# Patient Record
Sex: FEMALE | Race: BLACK OR AFRICAN AMERICAN
[De-identification: names, ages, dates, MRNs, and addresses within clinical notes are randomized per-mention and may not be internally consistent; named-entity substitution may affect disease eponyms.]

---

## 2018-06-22 ENCOUNTER — HOSPITAL ENCOUNTER (INPATIENT)
Dept: HOSPITAL 72 - EMR | Age: 53
LOS: 4 days | Discharge: HOME | DRG: 139 | End: 2018-06-26
Payer: COMMERCIAL

## 2018-06-22 VITALS — SYSTOLIC BLOOD PRESSURE: 120 MMHG | DIASTOLIC BLOOD PRESSURE: 68 MMHG

## 2018-06-22 VITALS — SYSTOLIC BLOOD PRESSURE: 128 MMHG | DIASTOLIC BLOOD PRESSURE: 74 MMHG

## 2018-06-22 VITALS — BODY MASS INDEX: 40.97 KG/M2 | HEIGHT: 64 IN | WEIGHT: 240 LBS

## 2018-06-22 VITALS — SYSTOLIC BLOOD PRESSURE: 121 MMHG | DIASTOLIC BLOOD PRESSURE: 65 MMHG

## 2018-06-22 VITALS — SYSTOLIC BLOOD PRESSURE: 120 MMHG | DIASTOLIC BLOOD PRESSURE: 61 MMHG

## 2018-06-22 VITALS — SYSTOLIC BLOOD PRESSURE: 123 MMHG | DIASTOLIC BLOOD PRESSURE: 76 MMHG

## 2018-06-22 VITALS — DIASTOLIC BLOOD PRESSURE: 71 MMHG | SYSTOLIC BLOOD PRESSURE: 95 MMHG

## 2018-06-22 DIAGNOSIS — K21.9: ICD-10-CM

## 2018-06-22 DIAGNOSIS — J45.909: ICD-10-CM

## 2018-06-22 DIAGNOSIS — Z88.6: ICD-10-CM

## 2018-06-22 DIAGNOSIS — E66.01: ICD-10-CM

## 2018-06-22 DIAGNOSIS — Z90.49: ICD-10-CM

## 2018-06-22 DIAGNOSIS — M19.90: ICD-10-CM

## 2018-06-22 DIAGNOSIS — J18.9: Primary | ICD-10-CM

## 2018-06-22 DIAGNOSIS — M94.0: ICD-10-CM

## 2018-06-22 DIAGNOSIS — Z86.718: ICD-10-CM

## 2018-06-22 LAB
ADD MANUAL DIFF: NO
ALBUMIN SERPL-MCNC: 3.5 G/DL (ref 3.4–5)
ALBUMIN/GLOB SERPL: 0.8 {RATIO} (ref 1–2.7)
ALP SERPL-CCNC: 105 U/L (ref 46–116)
ALT SERPL-CCNC: 23 U/L (ref 12–78)
ANION GAP SERPL CALC-SCNC: 8 MMOL/L (ref 5–15)
APPEARANCE UR: (no result)
APTT BLD: 31 SEC (ref 23–33)
APTT PPP: (no result) S
AST SERPL-CCNC: 14 U/L (ref 15–37)
BASOPHILS NFR BLD AUTO: 1.2 % (ref 0–2)
BILIRUB SERPL-MCNC: 0.2 MG/DL (ref 0.2–1)
BUN SERPL-MCNC: 16 MG/DL (ref 7–18)
CALCIUM SERPL-MCNC: 9 MG/DL (ref 8.5–10.1)
CHLORIDE SERPL-SCNC: 106 MMOL/L (ref 98–107)
CK MB SERPL-MCNC: 0.5 NG/ML (ref 0–3.6)
CK SERPL-CCNC: 65 U/L (ref 26–308)
CO2 SERPL-SCNC: 27 MMOL/L (ref 21–32)
CREAT SERPL-MCNC: 0.9 MG/DL (ref 0.55–1.3)
EOSINOPHIL NFR BLD AUTO: 1.4 % (ref 0–3)
ERYTHROCYTE [DISTWIDTH] IN BLOOD BY AUTOMATED COUNT: 13.1 % (ref 11.6–14.8)
GLOBULIN SER-MCNC: 4.2 G/DL
GLUCOSE UR STRIP-MCNC: NEGATIVE MG/DL
HCT VFR BLD CALC: 37.5 % (ref 37–47)
HGB BLD-MCNC: 12.4 G/DL (ref 12–16)
INR PPP: 0.9 (ref 0.9–1.1)
KETONES UR QL STRIP: NEGATIVE
LEUKOCYTE ESTERASE UR QL STRIP: (no result)
LYMPHOCYTES NFR BLD AUTO: 29.3 % (ref 20–45)
MCV RBC AUTO: 89 FL (ref 80–99)
MONOCYTES NFR BLD AUTO: 4.4 % (ref 1–10)
NEUTROPHILS NFR BLD AUTO: 63.6 % (ref 45–75)
NITRITE UR QL STRIP: NEGATIVE
PH UR STRIP: 7 [PH] (ref 4.5–8)
PLATELET # BLD: 359 K/UL (ref 150–450)
POTASSIUM SERPL-SCNC: 4.3 MMOL/L (ref 3.5–5.1)
PROT UR QL STRIP: NEGATIVE
RBC # BLD AUTO: 4.2 M/UL (ref 4.2–5.4)
SODIUM SERPL-SCNC: 141 MMOL/L (ref 136–145)
SP GR UR STRIP: 1 (ref 1–1.03)
UROBILINOGEN UR-MCNC: NORMAL MG/DL (ref 0–1)
WBC # BLD AUTO: 8.4 K/UL (ref 4.8–10.8)

## 2018-06-22 PROCEDURE — 71275 CT ANGIOGRAPHY CHEST: CPT

## 2018-06-22 PROCEDURE — 99285 EMERGENCY DEPT VISIT HI MDM: CPT

## 2018-06-22 PROCEDURE — 93017 CV STRESS TEST TRACING ONLY: CPT

## 2018-06-22 PROCEDURE — 71045 X-RAY EXAM CHEST 1 VIEW: CPT

## 2018-06-22 PROCEDURE — 80307 DRUG TEST PRSMV CHEM ANLYZR: CPT

## 2018-06-22 PROCEDURE — 80061 LIPID PANEL: CPT

## 2018-06-22 PROCEDURE — 87040 BLOOD CULTURE FOR BACTERIA: CPT

## 2018-06-22 PROCEDURE — 83880 ASSAY OF NATRIURETIC PEPTIDE: CPT

## 2018-06-22 PROCEDURE — 94664 DEMO&/EVAL PT USE INHALER: CPT

## 2018-06-22 PROCEDURE — 93306 TTE W/DOPPLER COMPLETE: CPT

## 2018-06-22 PROCEDURE — 78452 HT MUSCLE IMAGE SPECT MULT: CPT

## 2018-06-22 PROCEDURE — 85730 THROMBOPLASTIN TIME PARTIAL: CPT

## 2018-06-22 PROCEDURE — 85379 FIBRIN DEGRADATION QUANT: CPT

## 2018-06-22 PROCEDURE — 84443 ASSAY THYROID STIM HORMONE: CPT

## 2018-06-22 PROCEDURE — 82550 ASSAY OF CK (CPK): CPT

## 2018-06-22 PROCEDURE — 36415 COLL VENOUS BLD VENIPUNCTURE: CPT

## 2018-06-22 PROCEDURE — 82553 CREATINE MB FRACTION: CPT

## 2018-06-22 PROCEDURE — 84484 ASSAY OF TROPONIN QUANT: CPT

## 2018-06-22 PROCEDURE — 85025 COMPLETE CBC W/AUTO DIFF WBC: CPT

## 2018-06-22 PROCEDURE — 85610 PROTHROMBIN TIME: CPT

## 2018-06-22 PROCEDURE — 83605 ASSAY OF LACTIC ACID: CPT

## 2018-06-22 PROCEDURE — 80053 COMPREHEN METABOLIC PANEL: CPT

## 2018-06-22 PROCEDURE — 93005 ELECTROCARDIOGRAM TRACING: CPT

## 2018-06-22 PROCEDURE — 80048 BASIC METABOLIC PNL TOTAL CA: CPT

## 2018-06-22 PROCEDURE — 86140 C-REACTIVE PROTEIN: CPT

## 2018-06-22 PROCEDURE — 81003 URINALYSIS AUTO W/O SCOPE: CPT

## 2018-06-22 RX ADMIN — HEPARIN SODIUM SCH UNITS: 5000 INJECTION INTRAVENOUS; SUBCUTANEOUS at 21:38

## 2018-06-22 NOTE — HISTORY AND PHYSICAL REPORT
DATE OF ADMISSION:  06/22/2018



CHIEF COMPLAINT:  This is a 53-year-old, -American female, presents

with chief complaint of chest pain.



HISTORY OF PRESENT ILLNESS:  The patient has a history of right leg deep

venous thrombosis 12 years ago.  The patient was on Coumadin at that

time.



History of present illness began early this morning.  The patient was

awakened from her sleep with left-sided chest pain.  There was radiation

to the epigastric region.  The patient was transported via EMS to [facility].  The patient is admitted with chest pain to rule out acute

coronary syndrome versus pulmonary embolism.



REVIEW OF SYSTEMS:  CONSTITUTIONAL:  The patient denies weight loss or

weight gain.  The patient denies fevers or chills.  HEENT:  The patient

denies ear or throat pain.  The patient denies headache.  CARDIOVASCULAR:

The patient complains of chest pain as above.  The patient denies

palpitations.  CHEST:  The patient denies wheezing or shortness of breath.

ABDOMEN:  The patient denies nausea, vomiting, diarrhea, or constipation.

The patient does complain of epigastric pain as above.  NEUROMUSCULAR:

The patient denies seizures or generalized weakness.  GENITOURINARY:  The

patient denies dysuria or increased frequency of urination.



PAST MEDICAL HISTORY:  Significant for,



1. Right leg deep venous thrombosis 12 years ago.

2. Asthma.

3. Arthritis.



PAST SURGICAL HISTORY:  Significant for laparoscopic

cholecystectomy.



CURRENT MEDICATIONS:  The patient denies.



ALLERGIES:  To tramadol.



SOCIAL HISTORY:  The patient is single and is unemployed.  The patient

denies tobacco or alcohol use.



PHYSICAL EXAMINATION:

VITAL SIGNS:  Temperature 98.5, respirations 16, pulse 77, and blood

pressure 95/71.

GENERAL:  The patient is a well-developed, well-nourished, obese

-American female, in no apparent distress.

HEENT:  Eyes, pupils are equal responsive to light and accommodation.

Extraocular movements are intact.

NECK:  Supple without lymphadenopathy.

CHEST:  Lungs are clear to auscultation bilaterally without wheezes or

rales.

CARDIOVASCULAR:  Regular rhythm and rate.  S1 and S2 are normal without

murmurs, rubs, or gallops.

ABDOMEN:  Soft, nontender, nondistended with positive bowel sounds.  No

evidence of hepatosplenomegaly.  Currently, no rebound or guarding

noted.

EXTREMITIES:  Negative for clubbing, cyanosis, or edema.

RECTAL/GENITAL:  Refused.

NEUROLOGIC:  Cranial nerves II through XII are grossly intact without focal

deficits.  Motor strength is 5/5 bilaterally.  Deep tendon reflexes are 2+

plantar.



LABORATORY STUDIES:  WBC 8.4, hemoglobin 12.4, hematocrit 37.5, platelets

259,000, sodium 141, potassium 4.3, chloride 106, CO2 27, BUN 16,

creatinine 0.9, glucose 149.  Troponin normal at 0.017.  Chest x-ray

revealed retrocardiac infiltrate.  A CT angiogram of the chest was

reported as negative for pulmonary embolus.



ASSESSMENT:  This is a 53-year-old -American female:



1. Retrocardiac pneumonia.

2. Chest pain.

3. Epigastric pain.

4. Asthma.

5. Arthritis.

6. Obesity.



PLAN:

1. Retrocardiac pneumonia.  The patient has been started empirically on

intravenous Levaquin.  A Pulmonary consultation has been obtained with Dr. Debra Gunter.  The patient has received Zosyn in the emergency room.  We

will follow recommendations of Pulmonary.

2. Asthma.  The patient will remain on albuterol/ipratropium nebulized

q.4 h. p.r.n.

3. Morbid obesity.

4. Chest pain.  A Cardiology consultation has been obtained with Dr. Lopez.  Serial troponin levels will be performed.  Chest pain may be

secondary to pneumonia above or may be acute coronary syndrome.  We will

follow recommendation of Cardiology.









  ______________________________________________

  Nnamdi Talamantes M.D.





DR:  MARTINA

D:  06/22/2018 19:00

T:  06/22/2018 23:14

JOB#:  [id number]

CC:

## 2018-06-22 NOTE — DIAGNOSTIC IMAGING REPORT
Indication: Shortness of breath

 

Technique: One view of the chest

 

Comparison: none

 

Findings: There are retrocardiac infiltrates with air bronchograms. The right lung

and bilateral pleural spaces are clear. The heart is borderline enlarged

 

Impression: Retrocardiac infiltrate

 

Borderline cardiomegaly

## 2018-06-22 NOTE — CONSULTATION
History of Present Illness


General


Time patient seen:  18:52


Chief Complaint:  Abdominal Pain





Present Illness


HPI


54 y/o female presents w/CP and epigastric pain. EKG with anterolateral 

ischemia. Echo with preserved LV function, D dimer elevated, CTA negative for 

PE.  Hx of DVT 10 years ago.  Chest c ray with small left pleural effusion.  

Troponin negative x2


Allergies:  


Coded Allergies:  


     TRAMADOL (Verified  Allergy, Unknown, 6/22/18)





Patient History


Healthcare decision maker





Resuscitation status


Full Code


Advanced Directive on File








Review of Systems


Constitutional:  Reports: no symptoms


Eye:  Reports: no symptoms


ENT:  Reports: no symptoms


Respiratory:  Reports: no symptoms


Cardiovascular:  Reports: chest pain


Gastrointestinal:  Reports: abdominal pain


Genitourinary:  Reports: no symptoms


Musculoskeletal:  Reports: no symptoms


Skin:  Reports: no symptoms


Psychiatric:  Reports: no symptoms


Neurological:  Reports: no symptoms


Endocrine:  Reports: no symptoms


Hematologic/Lymphatic:  Reports: no symptoms





Physical Exam


General Appearance:  no apparent distress


Lines, tubes and drains:  peripheral


HEENT:  normocephalic


Neck:  non-tender


Respiratory/Chest:  chest wall non-tender


Cardiovascular/Chest:  normal peripheral pulses


Abdomen:  normal bowel sounds


Extremities:  normal range of motion


Skin Exam:  normal pigmentation


Neurologic:  CNs II-XII grossly normal





Last 24 Hour Vital Signs








  Date Time  Temp Pulse Resp B/P (MAP) Pulse Ox O2 Delivery O2 Flow Rate FiO2


 


6/22/18 10:47 98.5  16 95/71 99 Room Air  





 98.5       


 


6/22/18 10:47 98.5  16 95/71 99 Room Air  





 98.5       


 


6/22/18 08:41 98.5  16 120/61 99 Room Air  





 98.5       


 


6/22/18 08:40 98.2       


 


6/22/18 07:46 98.2  16 123/76 99 Room Air  





 98.2       


 


6/22/18 07:02 98.3 76 16 123/76 99 Room Air  





 98.2       











Laboratory Tests








Test


  6/22/18


07:30 6/22/18


09:20 6/22/18


10:00 6/22/18


14:05


 


White Blood Count


  8.4 K/UL


(4.8-10.8) 


  


  


 


 


Red Blood Count


  4.20 M/UL


(4.20-5.40) 


  


  


 


 


Hemoglobin


  12.4 G/DL


(12.0-16.0) 


  


  


 


 


Hematocrit


  37.5 %


(37.0-47.0) 


  


  


 


 


Mean Corpuscular Volume 89 FL (80-99)     


 


Mean Corpuscular Hemoglobin


  29.5 PG


(27.0-31.0) 


  


  


 


 


Mean Corpuscular Hemoglobin


Concent 33.0 G/DL


(32.0-36.0) 


  


  


 


 


Red Cell Distribution Width


  13.1 %


(11.6-14.8) 


  


  


 


 


Platelet Count


  359 K/UL


(150-450) 


  


  


 


 


Mean Platelet Volume


  6.0 FL


(6.5-10.1)  L 


  


  


 


 


Neutrophils (%) (Auto)


  63.6 %


(45.0-75.0) 


  


  


 


 


Lymphocytes (%) (Auto)


  29.3 %


(20.0-45.0) 


  


  


 


 


Monocytes (%) (Auto)


  4.4 %


(1.0-10.0) 


  


  


 


 


Eosinophils (%) (Auto)


  1.4 %


(0.0-3.0) 


  


  


 


 


Basophils (%) (Auto)


  1.2 %


(0.0-2.0) 


  


  


 


 


Prothrombin Time


  9.7 SEC


(9.30-11.50) 


  


  


 


 


Prothromb Time International


Ratio 0.9 (0.9-1.1)  


  


  


  


 


 


Activated Partial


Thromboplast Time 31 SEC (23-33)


  


  


  


 


 


D-Dimer


  1.52 mg/L FEU


(0.00-0.49)  H 


  


  


 


 


Sodium Level


  141 MMOL/L


(136-145) 


  


  


 


 


Potassium Level


  4.3 MMOL/L


(3.5-5.1) 


  


  


 


 


Chloride Level


  106 MMOL/L


() 


  


  


 


 


Carbon Dioxide Level


  27 MMOL/L


(21-32) 


  


  


 


 


Anion Gap


  8 mmol/L


(5-15) 


  


  


 


 


Blood Urea Nitrogen


  16 mg/dL


(7-18) 


  


  


 


 


Creatinine


  0.9 MG/DL


(0.55-1.30) 


  


  


 


 


Estimat Glomerular Filtration


Rate > 60 mL/min


(>60) 


  


  


 


 


Glucose Level


  149 MG/DL


()  H 


  


  


 


 


Calcium Level


  9.0 MG/DL


(8.5-10.1) 


  


  


 


 


Total Bilirubin


  0.2 MG/DL


(0.2-1.0) 


  


  


 


 


Aspartate Amino Transf


(AST/SGOT) 14 U/L (15-37)


L 


  


  


 


 


Alanine Aminotransferase


(ALT/SGPT) 23 U/L (12-78)


  


  


  


 


 


Alkaline Phosphatase


  105 U/L


() 


  


  


 


 


Total Creatine Kinase


  65 U/L


() 


  


  


 


 


Creatine Kinase MB


  0.5 NG/ML


(0.0-3.6) 


  


  


 


 


Creatine Kinase MB Relative


Index 0.7  


  


  


  


 


 


Troponin I


  0.017 ng/mL


(0.000-0.056) 


  


  0.017 ng/mL


(0.000-0.056)


 


Pro-B-Type Natriuretic Peptide


  64 pg/mL


(0-125) 


  


  


 


 


Total Protein


  7.7 G/DL


(6.4-8.2) 


  


  


 


 


Albumin


  3.5 G/DL


(3.4-5.0) 


  


  


 


 


Globulin 4.2 g/dL     


 


Albumin/Globulin Ratio


  0.8 (1.0-2.7)


L 


  


  


 


 


Urine Color  Pale yellow    


 


Urine Appearance


  


  Slightly


cloudy 


  


 


 


Urine pH  7 (4.5-8.0)    


 


Urine Specific Gravity


  


  1.005


(1.005-1.035) 


  


 


 


Urine Protein


  


  Negative


(NEGATIVE) 


  


 


 


Urine Glucose (UA)


  


  Negative


(NEGATIVE) 


  


 


 


Urine Ketones


  


  Negative


(NEGATIVE) 


  


 


 


Urine Occult Blood


  


  2+ (NEGATIVE)


H 


  


 


 


Urine Nitrite


  


  Negative


(NEGATIVE) 


  


 


 


Urine Bilirubin


  


  Negative


(NEGATIVE) 


  


 


 


Urine Urobilinogen


  


  Normal MG/DL


(0.0-1.0) 


  


 


 


Urine Leukocyte Esterase


  


  1+ (NEGATIVE)


H 


  


 


 


Urine RBC


  


  2-4 /HPF (0 -


2)  H 


  


 


 


Urine WBC


  


  2-4 /HPF (0 -


2) 


  


 


 


Urine Squamous Epithelial


Cells 


  Few /LPF


(NONE/OCC) 


  


 


 


Urine Bacteria


  


  Few /HPF


(NONE) 


  


 


 


Urine Opiates Screen


  


  Negative


(NEGATIVE) 


  


 


 


Urine Barbiturates Screen


  


  Negative


(NEGATIVE) 


  


 


 


Phencyclidine (PCP) Screen


  


  Negative


(NEGATIVE) 


  


 


 


Urine Amphetamines Screen


  


  Negative


(NEGATIVE) 


  


 


 


Urine Benzodiazepines Screen


  


  Negative


(NEGATIVE) 


  


 


 


Urine Cocaine Screen


  


  Negative


(NEGATIVE) 


  


 


 


Urine Marijuana (THC) Screen


  


  Negative


(NEGATIVE) 


  


 


 


Lactic Acid Level


  


  


  1.40 mmol/L


(0.4-2.0) 


 








Height (Feet):  5


Height (Inches):  4.00


Weight (Pounds):  240


Medications





Current Medications








 Medications


  (Trade)  Dose


 Ordered  Sig/Ellis


 Route


 PRN Reason  Start Time


 Stop Time Status Last Admin


Dose Admin


 


 Acetaminophen


  (Tylenol)  650 mg  Q4H  PRN


 ORAL


 FEVER  6/22/18 10:00


 7/22/18 09:59   


 


 


 Albuterol/


 Ipratropium


  (Albuterol/


 Ipratropium)  3 ml  Q4H  PRN


 HHN


 Shortness of Breath  6/22/18 11:15


 6/27/18 11:14   


 


 


 Aspirin


  (ASA)  162 mg  DAILY


 ORAL


   6/23/18 09:00


 7/23/18 08:59   


 


 


 Diltiazem HCl


  (Cardizem)  10 mg  Q1H  PRN


 IV


 heart rate more than 120  6/22/18 10:00


 7/22/18 09:59   


 


 


 Enalaprilat


  (Vasotec)  2.5 mg  Q6H  PRN


 IV


 sbp more than 160  6/22/18 10:00


 7/22/18 09:59   


 


 


 Heparin Sodium


  (Porcine)


  (Heparin 5000


 units/ml)  5,000 units  EVERY 12  HOURS


 SUBQ


   6/22/18 21:00


 7/22/18 20:59   


 


 


 Iopamidol


  (Isovue-370


 150ml)  150 ml  NOW  PRN


 INJ


 Radiology Procedure  6/22/18 07:15


 6/24/18 07:12   


 


 


 Ketorolac


 Tromethamine


  (Toradol 30mg)  30 mg  Q6H  PRN


 IV


 moderate pain ( 4-6)  6/22/18 10:00


 6/27/18 09:59  6/22/18 13:58


 


 


 Levofloxacin  100 ml @ 


 100 mls/hr  Q24H


 IVPB


   6/22/18 13:00


 6/29/18 12:59  6/22/18 13:45


 


 


 Nitroglycerin


  (Ntg)  0.4 mg  Q5M PRN


 SL


 Prn Chest Pain  6/22/18 10:00


 7/22/18 09:59   


 


 


 Ondansetron HCl


  (Zofran)  4 mg  Q6H  PRN


 IVP


 Nausea & Vomiting  6/22/18 10:00


 7/22/18 09:59   


 


 


 Polyethylene


 Glycol


  (Miralax)  17 gm  DAILYPRN  PRN


 ORAL


 Constipation  6/22/18 10:00


 7/22/18 09:59   


 


 


 Promethazine HCl/


 Codeine


  (Phenergan with


 Codeine)  5 ml  Q4H  PRN


 ORAL


 For Cough  6/22/18 11:15


 7/22/18 11:14   


 


 


 Temazepam


  (Restoril)  15 mg  HSPRN  PRN


 ORAL


 Insomnia  6/22/18 10:00


 6/29/18 09:59   


 











Assessment/Plan


Status:  stable


Assessment/Plan


1-Aspirin


2-Statin


3-H pylori testing


4- Omeprazole


5 -EKG/Echo reviewed, troponin negative


6 -Plan for stress test in 











Roshan Lopez M.D. Jun 22, 2018 16:48

## 2018-06-22 NOTE — HISTORY & PHYSICAL
History and Physical


History & Physicial


Dictated for Int Med-Dr Arnold no. [id number].











Nnamdi Talamantes MD Jun 22, 2018 19:01

## 2018-06-22 NOTE — EMERGENCY ROOM REPORT
History of Present Illness


General


Chief Complaint:  Abdominal Pain


Source:  Patient





Present Illness


HPI


Patient is a 53-year-old female brought in by EMS after increased burning 

sensation to her epigastric area.  Patient prior history of gastroesophageal 

reflux.  She reports having increased pain with deep breath.  Patient prior 

history of deep venous thrombosis and previously been on Coumadin.  She denies 

any recent Coumadin use.  Patient denied any hematemesis or bloody stools.  The 

patient was having prior history of gastric reflux.


Allergies:  


Coded Allergies:  


     TRAMADOL (Verified  Allergy, Unknown, 6/22/18)





Patient History


Past Medical History:  see triage record


Last Menstrual Period:  unk


Reviewed Nursing Documentation:  PMH: Agreed; PSxH: Agreed





Nursing Documentation-PMH


Past Medical History:  No History, Except For


Hx Asthma:  Yes


Hx Gastrointestinal Problems:  Yes - GERD





Review of Systems


All Other Systems:  negative except mentioned in HPI





Physical Exam





Vital Signs








  Date Time  Temp Pulse Resp B/P (MAP) Pulse Ox O2 Delivery O2 Flow Rate FiO2


 


6/22/18 07:02 98.3 76 16 123/76 99 Room Air  





 98.2       








Sp02 EP Interpretation:  reviewed, normal


General Appearance:  normal inspection, well appearing, no apparent distress, 

alert, GCS 15


Head:  atraumatic


ENT:  normal ENT inspection, hearing grossly normal, normal voice


Neck:  normal inspection, full range of motion, supple, no bony tend


Respiratory:  normal inspection, lungs clear, normal breath sounds, no 

respiratory distress, no retraction, no wheezing


Cardiovascular #1:  regular rate, rhythm, no edema


Gastrointestinal:  normal inspection, normal bowel sounds, non tender, soft, no 

guarding, no hernia


Genitourinary:  no CVA tenderness


Musculoskeletal:  normal inspection, back normal, normal range of motion


Neurologic:  normal inspection, alert, oriented x3, responsive, CNs III-XII nml 

as tested, speech normal


Psychiatric:  normal inspection, judgement/insight normal, mood/affect normal


Skin:  normal inspection, normal color, no rash





Medical Decision Making


Diagnostic Impression:  


 Primary Impression:  


 Chest pain


 Additional Impressions:  


 ACS (acute coronary syndrome)


 Pleural effusion, left


 Pneumonia


ER Course


Patient presented for chest pain.  Differential diagnosis included but was not 

limited to acute coronary syndrome, pulmonary embolism, pneumonia, aortic 

dissection, shingles, pneumothorax, aortic dissection, esophageal rupture, 

pericarditis. Because of complexity of patient's case laboratory testing and 

imaging studies were ordered.


EKG interpreted by me showed normal sinus rhythm with a rate of 71 with 

anterior lateral T-wave inversion.  The patient was given aspirin.  She was 

also given acid blockers.  A CTA was ordered due to patient's prior history of 

DVT and risk for PE.  CTA read by radiology showed left basilar consolidation, 

left pleural fluid.  Minimal pericardial thickening.   Dr. Josue Arnold was 

contacted for inpatient management





Labs








Test


  6/22/18


07:30


 


White Blood Count


  8.4 K/UL


(4.8-10.8)


 


Red Blood Count


  4.20 M/UL


(4.20-5.40)


 


Hemoglobin


  12.4 G/DL


(12.0-16.0)


 


Hematocrit


  37.5 %


(37.0-47.0)


 


Mean Corpuscular Volume 89 FL (80-99) 


 


Mean Corpuscular Hemoglobin


  29.5 PG


(27.0-31.0)


 


Mean Corpuscular Hemoglobin


Concent 33.0 G/DL


(32.0-36.0)


 


Red Cell Distribution Width


  13.1 %


(11.6-14.8)


 


Platelet Count


  359 K/UL


(150-450)


 


Mean Platelet Volume


  6.0 FL


(6.5-10.1)


 


Neutrophils (%) (Auto)


  63.6 %


(45.0-75.0)


 


Lymphocytes (%) (Auto)


  29.3 %


(20.0-45.0)


 


Monocytes (%) (Auto)


  4.4 %


(1.0-10.0)


 


Eosinophils (%) (Auto)


  1.4 %


(0.0-3.0)


 


Basophils (%) (Auto)


  1.2 %


(0.0-2.0)


 


Prothrombin Time


  9.7 SEC


(9.30-11.50)


 


Prothromb Time International


Ratio 0.9 (0.9-1.1) 


 


 


Activated Partial


Thromboplast Time 31 SEC (23-33) 


 


 


D-Dimer


  1.52 mg/L FEU


(0.00-0.49)


 


Sodium Level


  141 MMOL/L


(136-145)


 


Potassium Level


  4.3 MMOL/L


(3.5-5.1)


 


Chloride Level


  106 MMOL/L


()


 


Carbon Dioxide Level


  27 MMOL/L


(21-32)


 


Anion Gap


  8 mmol/L


(5-15)


 


Blood Urea Nitrogen


  16 mg/dL


(7-18)


 


Creatinine


  0.9 MG/DL


(0.55-1.30)


 


Estimat Glomerular Filtration


Rate > 60 mL/min


(>60)


 


Glucose Level


  149 MG/DL


()


 


Calcium Level


  9.0 MG/DL


(8.5-10.1)


 


Total Bilirubin


  0.2 MG/DL


(0.2-1.0)


 


Aspartate Amino Transf


(AST/SGOT) 14 U/L (15-37) 


 


 


Alanine Aminotransferase


(ALT/SGPT) 23 U/L (12-78) 


 


 


Alkaline Phosphatase


  105 U/L


()


 


Total Creatine Kinase


  65 U/L


()


 


Creatine Kinase MB


  0.5 NG/ML


(0.0-3.6)


 


Creatine Kinase MB Relative


Index 0.7 


 


 


Troponin I


  0.017 ng/mL


(0.000-0.056)


 


Pro-B-Type Natriuretic Peptide


  64 pg/mL


(0-125)


 


Total Protein


  7.7 G/DL


(6.4-8.2)


 


Albumin


  3.5 G/DL


(3.4-5.0)


 


Globulin 4.2 g/dL 


 


Albumin/Globulin Ratio 0.8 (1.0-2.7) 








EKG Diagnostic Results


Rate:  normal


Rhythm:  NSR


ST Segments:  other - anterolateral t wave inversion





Rhythm Strip Diag. Results


EP Interpretation:  yes


Rhythm:  NSR - 76, no PVC's, no ectopy





Last Vital Signs








  Date Time  Temp Pulse Resp B/P (MAP) Pulse Ox O2 Delivery O2 Flow Rate FiO2


 


6/22/18 07:02 98.3 76 16 123/76 99 Room Air  





 98.2       








Status:  unchanged


Disposition:  ADMITTED AS INPATIENT


Condition:  Serious











Carson Chowdhury MD Jun 22, 2018 07:39

## 2018-06-22 NOTE — DIAGNOSTIC IMAGING REPORT
ndication: Chest pain

 

Technique: IV administration nonionic contrast. Spiral acquisitions obtained from the

lung bases to the lung apices. Multiplanar and 3-D reconstructions were generated.

Total dose length product 1163.42 mGycm. CTDIvol(s) 37.74 mGy.  Dose reduction

achieved using automated exposure control

 

 

Comparison: none

 

Findings: There is good quality opacification of the pulmonary arteries. No

intraluminal filling defects or other findings to suggest acute pulmonary embolus

demonstrated. No evidence of thoracic aortic aneurysm or dissection. No definite

pulmonary arterial dilatation or right ventricular dilatation. The heart is mildly

enlarged

 

Areas of consolidation are seen in the left lower lobe and minimally in the posterior

left upper lobe. Dependent atelectatic changes are seen in the right lower lobe.

There is trace pleural fluid on the left. No masses or nodules.

 

There is minimal pericardial thickening or fluid. No mediastinal or hilar mass or

adenopathy. No axillary or chest wall mass or adenopathy.

 

The included upper abdominal anatomy is unremarkable

 

Impression: Negative for evidence of acute pulmonary embolus or other acute thoracic

vascular pathology

 

Left basilar pulmonary parenchymal consolidation, may indicate pneumonia

 

Trace left pleural fluid

 

Minimal dependent right lower lobe atelectatic changes

 

Minimal pericardial thickening or fluid

 

 

 

The CT scanner at [facility] is accredited by the [employer] College of

Radiology and the scans are performed using protocols designed to limit radiation

exposure to as low as reasonably achievable to attain images of sufficient resolution

adequate for diagnostic evaluation.

## 2018-06-22 NOTE — CONSULTATION
History of Present Illness


General


Date patient seen:  Jun 22, 2018


Chief Complaint:  Abdominal Pain





Present Illness


HPI


 53-year-old female with hx of asmth, non-smoker, DVT brought in by EMS  with 

CC of burning sensation to her epigastric area.   She reports having increased 

pain with deep breath.  No fever or cough.  Pt is admitted to telemetry b/o 

abnormal EKG. A CT of chest ruled out acute PE and showed some parenchymal 

changes in LLL.


Allergies:  


Coded Allergies:  


     TRAMADOL (Verified  Allergy, Unknown, 6/22/18)





Patient History


Healthcare decision maker





Resuscitation status





Advanced Directive on File








Past Medical/Surgical History


Past Medical/Surgical History:  


(1) History of asthma





Review of Systems


All Other Systems:  negative except mentioned in HPI





Physical Exam


General Appearance:  WD/WN, no apparent distress


Lines, tubes and drains:  peripheral


HEENT:  normocephalic


Neck:  non-tender, normal alignment


Respiratory/Chest:  chest wall non-tender, lungs clear


Breasts:  no masses


Cardiovascular/Chest:  normal peripheral pulses, normal rate, regularly 

irregular


Abdomen:  normal bowel sounds


Genitourinary/Rectal:  normal genital exam


Extremities:  normal range of motion


Skin Exam:  normal pigmentation


Neurologic:  CNs II-XII grossly normal





Last 24 Hour Vital Signs








  Date Time  Temp Pulse Resp B/P (MAP) Pulse Ox O2 Delivery O2 Flow Rate FiO2


 


6/22/18 10:47 98.5  16 95/71 99 Room Air  





 98.5       


 


6/22/18 10:47 98.5  16 95/71 99 Room Air  





 98.5       


 


6/22/18 08:41 98.5  16 120/61 99 Room Air  





 98.5       


 


6/22/18 08:40 98.2       


 


6/22/18 07:46 98.2  16 123/76 99 Room Air  





 98.2       


 


6/22/18 07:02 98.3 76 16 123/76 99 Room Air  





 98.2       











Laboratory Tests








Test


  6/22/18


07:30 6/22/18


09:20 6/22/18


10:00


 


White Blood Count


  8.4 K/UL


(4.8-10.8) 


  


 


 


Red Blood Count


  4.20 M/UL


(4.20-5.40) 


  


 


 


Hemoglobin


  12.4 G/DL


(12.0-16.0) 


  


 


 


Hematocrit


  37.5 %


(37.0-47.0) 


  


 


 


Mean Corpuscular Volume 89 FL (80-99)    


 


Mean Corpuscular Hemoglobin


  29.5 PG


(27.0-31.0) 


  


 


 


Mean Corpuscular Hemoglobin


Concent 33.0 G/DL


(32.0-36.0) 


  


 


 


Red Cell Distribution Width


  13.1 %


(11.6-14.8) 


  


 


 


Platelet Count


  359 K/UL


(150-450) 


  


 


 


Mean Platelet Volume


  6.0 FL


(6.5-10.1)  L 


  


 


 


Neutrophils (%) (Auto)


  63.6 %


(45.0-75.0) 


  


 


 


Lymphocytes (%) (Auto)


  29.3 %


(20.0-45.0) 


  


 


 


Monocytes (%) (Auto)


  4.4 %


(1.0-10.0) 


  


 


 


Eosinophils (%) (Auto)


  1.4 %


(0.0-3.0) 


  


 


 


Basophils (%) (Auto)


  1.2 %


(0.0-2.0) 


  


 


 


Prothrombin Time


  9.7 SEC


(9.30-11.50) 


  


 


 


Prothromb Time International


Ratio 0.9 (0.9-1.1)  


  


  


 


 


Activated Partial


Thromboplast Time 31 SEC (23-33)


  


  


 


 


D-Dimer


  1.52 mg/L FEU


(0.00-0.49)  H 


  


 


 


Sodium Level


  141 MMOL/L


(136-145) 


  


 


 


Potassium Level


  4.3 MMOL/L


(3.5-5.1) 


  


 


 


Chloride Level


  106 MMOL/L


() 


  


 


 


Carbon Dioxide Level


  27 MMOL/L


(21-32) 


  


 


 


Anion Gap


  8 mmol/L


(5-15) 


  


 


 


Blood Urea Nitrogen


  16 mg/dL


(7-18) 


  


 


 


Creatinine


  0.9 MG/DL


(0.55-1.30) 


  


 


 


Estimat Glomerular Filtration


Rate > 60 mL/min


(>60) 


  


 


 


Glucose Level


  149 MG/DL


()  H 


  


 


 


Calcium Level


  9.0 MG/DL


(8.5-10.1) 


  


 


 


Total Bilirubin


  0.2 MG/DL


(0.2-1.0) 


  


 


 


Aspartate Amino Transf


(AST/SGOT) 14 U/L (15-37)


L 


  


 


 


Alanine Aminotransferase


(ALT/SGPT) 23 U/L (12-78)


  


  


 


 


Alkaline Phosphatase


  105 U/L


() 


  


 


 


Total Creatine Kinase


  65 U/L


() 


  


 


 


Creatine Kinase MB


  0.5 NG/ML


(0.0-3.6) 


  


 


 


Creatine Kinase MB Relative


Index 0.7  


  


  


 


 


Troponin I


  0.017 ng/mL


(0.000-0.056) 


  


 


 


Pro-B-Type Natriuretic Peptide


  64 pg/mL


(0-125) 


  


 


 


Total Protein


  7.7 G/DL


(6.4-8.2) 


  


 


 


Albumin


  3.5 G/DL


(3.4-5.0) 


  


 


 


Globulin 4.2 g/dL    


 


Albumin/Globulin Ratio


  0.8 (1.0-2.7)


L 


  


 


 


Urine Color  Pale yellow   


 


Urine Appearance


  


  Slightly


cloudy 


 


 


Urine pH  7 (4.5-8.0)   


 


Urine Specific Gravity


  


  1.005


(1.005-1.035) 


 


 


Urine Protein


  


  Negative


(NEGATIVE) 


 


 


Urine Glucose (UA)


  


  Negative


(NEGATIVE) 


 


 


Urine Ketones


  


  Negative


(NEGATIVE) 


 


 


Urine Occult Blood


  


  2+ (NEGATIVE)


H 


 


 


Urine Nitrite


  


  Negative


(NEGATIVE) 


 


 


Urine Bilirubin


  


  Negative


(NEGATIVE) 


 


 


Urine Urobilinogen


  


  Normal MG/DL


(0.0-1.0) 


 


 


Urine Leukocyte Esterase


  


  1+ (NEGATIVE)


H 


 


 


Urine RBC


  


  2-4 /HPF (0 -


2)  H 


 


 


Urine WBC


  


  2-4 /HPF (0 -


2) 


 


 


Urine Squamous Epithelial


Cells 


  Few /LPF


(NONE/OCC) 


 


 


Urine Bacteria


  


  Few /HPF


(NONE) 


 


 


Urine Opiates Screen


  


  Negative


(NEGATIVE) 


 


 


Urine Barbiturates Screen


  


  Negative


(NEGATIVE) 


 


 


Phencyclidine (PCP) Screen


  


  Negative


(NEGATIVE) 


 


 


Urine Amphetamines Screen


  


  Negative


(NEGATIVE) 


 


 


Urine Benzodiazepines Screen


  


  Negative


(NEGATIVE) 


 


 


Urine Cocaine Screen


  


  Negative


(NEGATIVE) 


 


 


Urine Marijuana (THC) Screen


  


  Negative


(NEGATIVE) 


 


 


Lactic Acid Level


  


  


  1.40 mmol/L


(0.4-2.0)








Height (Feet):  5


Height (Inches):  4.00


Weight (Pounds):  243


Medications





Current Medications








 Medications


  (Trade)  Dose


 Ordered  Sig/Ellis


 Route


 PRN Reason  Start Time


 Stop Time Status Last Admin


Dose Admin


 


 Acetaminophen


  (Tylenol)  650 mg  Q4H  PRN


 ORAL


 FEVER  6/22/18 10:00


 7/22/18 09:59   


 


 


 Albuterol/


 Ipratropium


  (Albuterol/


 Ipratropium)  3 ml  Q4H  PRN


 HHN


 Shortness of Breath  6/22/18 10:00


 6/27/18 09:59   


 


 


 Aspirin


  (ASA)  162 mg  DAILY


 ORAL


   6/23/18 09:00


 7/23/18 08:59   


 


 


 Diltiazem HCl


  (Cardizem)  10 mg  Q1H  PRN


 IV


 heart rate more than 120  6/22/18 10:00


 7/22/18 09:59   


 


 


 Enalaprilat


  (Vasotec)  2.5 mg  Q6H  PRN


 IV


 sbp more than 160  6/22/18 10:00


 7/22/18 09:59   


 


 


 Heparin Sodium


  (Porcine)


  (Heparin 5000


 units/ml)  5,000 units  EVERY 12  HOURS


 SUBQ


   6/22/18 21:00


 7/22/18 20:59   


 


 


 Iopamidol


  (Isovue-370


 150ml)  150 ml  NOW  PRN


 INJ


 Radiology Procedure  6/22/18 07:15


 6/24/18 07:12   


 


 


 Ketorolac


 Tromethamine


  (Toradol 30mg)  30 mg  Q6H  PRN


 IV


 moderate pain ( 4-6)  6/22/18 10:00


 6/27/18 09:59   


 


 


 Nitroglycerin


  (Ntg)  0.4 mg  Q5M PRN


 SL


 Prn Chest Pain  6/22/18 10:00


 7/22/18 09:59   


 


 


 Ondansetron HCl


  (Zofran)  4 mg  Q6H  PRN


 IVP


 Nausea & Vomiting  6/22/18 10:00


 7/22/18 09:59   


 


 


 Polyethylene


 Glycol


  (Miralax)  17 gm  DAILYPRN  PRN


 ORAL


 Constipation  6/22/18 10:00


 7/22/18 09:59   


 


 


 Temazepam


  (Restoril)  15 mg  HSPRN  PRN


 ORAL


 Insomnia  6/22/18 10:00


 6/29/18 09:59   


 











Assessment/Plan


Problem List:  


(1) ACS (acute coronary syndrome)


ICD Codes:  I24.9 - Acute ischemic heart disease, unspecified


SNOMED:  041600608


(2) Pneumonia


ICD Codes:  J18.9 - Pneumonia, unspecified organism


SNOMED:  197695279


(3) Costochondritis


ICD Codes:  M94.0 - Chondrocostal junction syndrome [[place]ietze]


SNOMED:  43577744


(4) History of asthma


ICD Codes:  Z87.09 - Personal history of other diseases of the respiratory 

system


SNOMED:  210908380


(5) GERD (gastroesophageal reflux disease)


ICD Codes:  K21.9 - Gastro-esophageal reflux disease without esophagitis


SNOMED:  219427302


Assessment/Plan


serial EKG, troponin,


Echo, 


cardiology to see


sputum for c/s


start Levofloxacin


symptomatic treatment


respiratory treatment.











Debra Gunter MD Jun 22, 2018 11:15

## 2018-06-22 NOTE — CARDIOLOGY REPORT
--------------- APPROVED REPORT --------------





EXAM: Two-dimensional and M-mode echocardiogram with Doppler and color 

Doppler.



INDICATION

LV FUNCTION 



M-Mode DIMENSIONS 

IVSd1.2 (0.7-1.1cm)Left Atrium (MM)3.3 (1.6-4.0cm)

LVDd5.7 (3.5-5.6cm)Aortic Root3.5 (2.0-3.7cm)

PWd1.3 (0.7-1.1cm)Aortic Cusp Exc.1.9 (1.5-2.0cm)

IVSs1.4 cm

LVDs3.9 (2.5-4.0cm)

PWs1.7 cm





Normal left ventricular chamber size, systolic function and wall motion .

Left ventricular ejection fraction estimated to be  65-70 %.

Mild left ventricular hypertrophy by 2-D.

No evidence of pericardial effusion. 

All other cardiac chamber sizes are within normal limits. 

Focal aortic valve sclerosis with adequate cusp excursion.

Mildly Thickened mitral valve leaflets with normal excursion.

Mildly Mitral annulus and aortic root calcification.

Pulmonic valve not well visualized.

Normal tricuspid valve structure. 

IVC at normal size with physiologic collapse .



A  color flow and spectral Doppler study was performed and revealed:

No aortic regurgitation.

Trace  mitral regurgitation.

Mitral diastolic velocities suggest reduced left ventricular relaxation c/w mild LV 

diastolic dysfunction (Grade I ). 

Mild  tricuspid regurgitation.

Tricuspid  systolic velocities suggests peak right ventricular systolic pressure of  

30mmHg.

Trace Pulmonic regurgitation present.

## 2018-06-22 NOTE — CARDIOLOGY REPORT
--------------- APPROVED REPORT --------------





EKG Measurement

Heart [id number]

CA [id number]

[id number]

[id number]

[id number]





Normal sinus rhythm

T wave abnormality, consider anterolateral ischemia

Abnormal ECG

## 2018-06-23 VITALS — DIASTOLIC BLOOD PRESSURE: 71 MMHG | SYSTOLIC BLOOD PRESSURE: 124 MMHG

## 2018-06-23 VITALS — DIASTOLIC BLOOD PRESSURE: 73 MMHG | SYSTOLIC BLOOD PRESSURE: 154 MMHG

## 2018-06-23 VITALS — SYSTOLIC BLOOD PRESSURE: 122 MMHG | DIASTOLIC BLOOD PRESSURE: 72 MMHG

## 2018-06-23 VITALS — DIASTOLIC BLOOD PRESSURE: 62 MMHG | SYSTOLIC BLOOD PRESSURE: 115 MMHG

## 2018-06-23 VITALS — DIASTOLIC BLOOD PRESSURE: 70 MMHG | SYSTOLIC BLOOD PRESSURE: 120 MMHG

## 2018-06-23 VITALS — SYSTOLIC BLOOD PRESSURE: 131 MMHG | DIASTOLIC BLOOD PRESSURE: 71 MMHG

## 2018-06-23 LAB
ADD MANUAL DIFF: NO
APTT BLD: 31 SEC (ref 23–33)
BASOPHILS NFR BLD AUTO: 1 % (ref 0–2)
CHOLEST SERPL-MCNC: 196 MG/DL (ref ?–200)
EOSINOPHIL NFR BLD AUTO: 1.9 % (ref 0–3)
ERYTHROCYTE [DISTWIDTH] IN BLOOD BY AUTOMATED COUNT: 13.2 % (ref 11.6–14.8)
HCT VFR BLD CALC: 38 % (ref 37–47)
HDLC SERPL-MCNC: 64 MG/DL (ref 40–60)
HGB BLD-MCNC: 12.1 G/DL (ref 12–16)
INR PPP: 1 (ref 0.9–1.1)
LYMPHOCYTES NFR BLD AUTO: 29.4 % (ref 20–45)
MCV RBC AUTO: 89 FL (ref 80–99)
MONOCYTES NFR BLD AUTO: 4.4 % (ref 1–10)
NEUTROPHILS NFR BLD AUTO: 63.2 % (ref 45–75)
PLATELET # BLD: 382 K/UL (ref 150–450)
RBC # BLD AUTO: 4.25 M/UL (ref 4.2–5.4)
TRIGL SERPL-MCNC: 68 MG/DL (ref 30–150)
WBC # BLD AUTO: 7 K/UL (ref 4.8–10.8)

## 2018-06-23 RX ADMIN — ASPIRIN 81 MG SCH MG: 81 TABLET ORAL at 08:12

## 2018-06-23 RX ADMIN — HEPARIN SODIUM SCH UNITS: 5000 INJECTION INTRAVENOUS; SUBCUTANEOUS at 08:15

## 2018-06-23 RX ADMIN — HEPARIN SODIUM SCH UNITS: 5000 INJECTION INTRAVENOUS; SUBCUTANEOUS at 21:18

## 2018-06-23 NOTE — INTERNAL MED PROGRESS NOTE
Subjective


Date of Service:  Jun 23, 2018


Physician Name


Talamantes,Nnamdi


Attending Physician


Josue Arnold MD





Current Medications








 Medications


  (Trade)  Dose


 Ordered  Sig/Ellis


 Route


 PRN Reason  Start Time


 Stop Time Status Last Admin


Dose Admin


 


 Acetaminophen


  (Tylenol)  650 mg  Q4H  PRN


 ORAL


 FEVER  6/22/18 10:00


 7/22/18 09:59   


 


 


 Albuterol/


 Ipratropium


  (Albuterol/


 Ipratropium)  3 ml  Q4H  PRN


 HHN


 Shortness of Breath  6/22/18 11:15


 6/27/18 11:14   


 


 


 Aspirin


  (ASA)  162 mg  DAILY


 ORAL


   6/23/18 09:00


 7/23/18 08:59  6/23/18 08:12


 


 


 Diltiazem HCl


  (Cardizem)  10 mg  Q1H  PRN


 IV


 heart rate more than 120  6/22/18 10:00


 7/22/18 09:59   


 


 


 Enalaprilat


  (Vasotec)  2.5 mg  Q6H  PRN


 IV


 sbp more than 160  6/22/18 10:00


 7/22/18 09:59   


 


 


 Heparin Sodium


  (Porcine)


  (Heparin 5000


 units/ml)  5,000 units  EVERY 12  HOURS


 SUBQ


   6/22/18 21:00


 7/22/18 20:59  6/23/18 08:15


 


 


 Iopamidol


  (Isovue-370


 150ml)  150 ml  NOW  PRN


 INJ


 Radiology Procedure  6/22/18 07:15


 6/24/18 07:12   


 


 


 Ketorolac


 Tromethamine


  (Toradol 30mg)  30 mg  Q6H  PRN


 IV


 moderate pain ( 4-6)  6/22/18 10:00


 6/27/18 09:59  6/22/18 13:58


 


 


 Levofloxacin  100 ml @ 


 100 mls/hr  Q24H


 IVPB


   6/22/18 13:00


 6/29/18 12:59  6/23/18 13:07


 


 


 Nitroglycerin


  (Ntg)  0.4 mg  Q5M PRN


 SL


 Prn Chest Pain  6/22/18 10:00


 7/22/18 09:59   


 


 


 Ondansetron HCl


  (Zofran)  4 mg  Q6H  PRN


 IVP


 Nausea & Vomiting  6/22/18 10:00


 7/22/18 09:59   


 


 


 Polyethylene


 Glycol


  (Miralax)  17 gm  DAILYPRN  PRN


 ORAL


 Constipation  6/22/18 10:00


 7/22/18 09:59   


 


 


 Promethazine HCl/


 Codeine


  (Phenergan with


 Codeine)  5 ml  Q4H  PRN


 ORAL


 For Cough  6/22/18 11:15


 7/22/18 11:14   


 


 


 Temazepam


  (Restoril)  15 mg  HSPRN  PRN


 ORAL


 Insomnia  6/22/18 10:00


 6/29/18 09:59   


 








Allergies:  


Coded Allergies:  


     TRAMADOL (Verified  Allergy, Unknown, 6/22/18)


ROS Limited/Unobtainable:  No


Constitutional:  Reports: no symptoms


HEENT:  Reports: no symptoms


Cardiovascular:  Reports: chest pain


Respiratory:  Reports: no symptoms


Gastrointestinal/Abdominal:  Reports: no symptoms


Genitourinary:  Reports: no symptoms


Neurologic/Psychiatric:  Reports: no symptoms


Subjective


54 YO F admitted with chest pain.  Cover for Int Med - Dr Arnold.  Await cardiac 

stress test





Objective





Last Vital Signs








  Date Time  Temp Pulse Resp B/P (MAP) Pulse Ox O2 Delivery O2 Flow Rate FiO2


 


6/23/18 12:00 97.9 81 20 124/71 95 Room Air  





 97.9       








General Appearance:  WD/WN, no apparent distress, alert


EENT:  PERRL/EOMI, normal ENT inspection


Neck:  non-tender, normal alignment, supple


Cardiovascular:  normal peripheral pulses, normal rate, regular rhythm, no 

gallop/murmur, no JVD


Respiratory/Chest:  chest wall non-tender, lungs clear, normal breath sounds, 

no respiratory distress, no accessory muscle use


Abdomen:  normal bowel sounds, non tender, soft, no organomegaly, no mass


Extremities:  normal range of motion, non-tender


Neurologic:  CNs II-XII grossly normal, no motor/sensory deficits


Skin:  normal pigmentation, warm/dry





Laboratory Tests








Test


  6/23/18


10:00


 


White Blood Count


  7.0 K/UL


(4.8-10.8)


 


Red Blood Count


  4.25 M/UL


(4.20-5.40)


 


Hemoglobin


  12.1 G/DL


(12.0-16.0)


 


Hematocrit


  38.0 %


(37.0-47.0)


 


Mean Corpuscular Volume 89 FL (80-99)  


 


Mean Corpuscular Hemoglobin


  28.5 PG


(27.0-31.0)


 


Mean Corpuscular Hemoglobin


Concent 31.9 G/DL


(32.0-36.0)  L


 


Red Cell Distribution Width


  13.2 %


(11.6-14.8)


 


Platelet Count


  382 K/UL


(150-450)


 


Mean Platelet Volume


  5.5 FL


(6.5-10.1)  L


 


Neutrophils (%) (Auto)


  63.2 %


(45.0-75.0)


 


Lymphocytes (%) (Auto)


  29.4 %


(20.0-45.0)


 


Monocytes (%) (Auto)


  4.4 %


(1.0-10.0)


 


Eosinophils (%) (Auto)


  1.9 %


(0.0-3.0)


 


Basophils (%) (Auto)


  1.0 %


(0.0-2.0)


 


Prothrombin Time


  10.3 SEC


(9.30-11.50)


 


Prothromb Time International


Ratio 1.0 (0.9-1.1)  


 


 


Activated Partial


Thromboplast Time 31 SEC (23-33)


 


 


Troponin I


  0.000 ng/mL


(0.000-0.056)


 


C-Reactive Protein,


Quantitative 4.8 mg/dL


(0.00-0.90)  H


 


Triglycerides Level


  68 MG/DL


()


 


Cholesterol Level


  196 MG/DL (<


200)


 


LDL Cholesterol


  117 mg/dL


(<100)  H


 


HDL Cholesterol


  64 MG/DL


(40-60)  H


 


Cholesterol/HDL Ratio


  3.1 (3.3-4.4)


L


 


Thyroid Stimulating Hormone


(TSH) 1.840 uiU/mL


(0.358-3.740)











Microbiology








 Date/Time


Source Procedure


Growth Status


 


 


 6/22/18 10:02


Blood Blood Culture - Preliminary


NO GROWTH AFTER 24 HOURS Resulted


 


 6/22/18 10:00


Blood Blood Culture - Preliminary


NO GROWTH AFTER 24 HOURS Resulted

















Intake and Output  


 


 6/22/18 6/23/18





 19:00 07:00


 


Intake Total 300 ml 250 ml


 


Balance 300 ml 250 ml


 


  


 


Intake Oral 300 ml 250 ml


 


# Voids 4 5











Assessment/Plan


Problem List:  


(1) Arthritis


(2) Obesity


(3) Asthma


(4) Epigastric abdominal pain


(5) Chest pain


Assessment & Plan:  See cardiology note.  Await cardiac stress test





Status:  not improved











Nnamdi Talamantes MD Jun 23, 2018 15:41

## 2018-06-23 NOTE — CARDIOLOGY PROGRESS NOTE
Assessment/Plan


Status:  stable


Assessment/Plan


1-Aspirin


2-Statin


3-H pylori testing


4- Omeprazole


5 -EKG/Echo reviewed, troponin negative, CTA negative


6 -Plan for stress test in AM





Subjective


Cardiovascular:  Reports: no symptoms


Respiratory:  Reports: no symptoms


Gastrointestinal/Abdominal:  Reports: no symptoms


Genitourinary:  Reports: no symptoms


Subjective


No acute events


Troponin negative


CTA negative for PE


TTe with preserved function


No current chest pain





Objective





Last 24 Hour Vital Signs








  Date Time  Temp Pulse Resp B/P (MAP) Pulse Ox O2 Delivery O2 Flow Rate FiO2


 


6/23/18 12:00 97.9 81 20 124/71 95 Room Air  





 97.9       


 


6/23/18 12:00  76      


 


6/23/18 08:00 97.8 77 21 154/73 94 Room Air  





 97.8       


 


6/23/18 08:00  77      


 


6/23/18 04:00  65      


 


6/23/18 04:00 97.6 68 19 120/70 96 Room Air  





 97.6       


 


6/23/18 00:00 98.0 70 20 115/62 96 Room Air  





 98.0       


 


6/22/18 20:00  75      


 


6/22/18 20:00 98.1 76 20 128/74 96 Room Air  





 98.1       


 


6/22/18 16:00  84      


 


6/22/18 16:00 98.0 77 19 121/65 96 Room Air  





 98.0       








General Appearance:  no apparent distress


EENT:  PERRL/EOMI


Neck:  non-tender


Rhythm:  NSR


Cardiovascular:  normal peripheral pulses


Respiratory/Chest:  chest wall non-tender


Abdomen:  normal bowel sounds, non tender


Extremities:  normal range of motion


Neurologic:  CNs II-XII grossly normal











Intake and Output  


 


 6/22/18 6/23/18





 19:00 07:00


 


Intake Total 300 ml 250 ml


 


Balance 300 ml 250 ml


 


  


 


Intake Oral 300 ml 250 ml


 


# Voids 4 5











Laboratory Tests








Test


  6/22/18


14:05 6/23/18


10:00


 


Troponin I


  0.017 ng/mL


(0.000-0.056) 0.000 ng/mL


(0.000-0.056)


 


White Blood Count


  


  7.0 K/UL


(4.8-10.8)


 


Red Blood Count


  


  4.25 M/UL


(4.20-5.40)


 


Hemoglobin


  


  12.1 G/DL


(12.0-16.0)


 


Hematocrit


  


  38.0 %


(37.0-47.0)


 


Mean Corpuscular Volume  89 FL (80-99)  


 


Mean Corpuscular Hemoglobin


  


  28.5 PG


(27.0-31.0)


 


Mean Corpuscular Hemoglobin


Concent 


  31.9 G/DL


(32.0-36.0)  L


 


Red Cell Distribution Width


  


  13.2 %


(11.6-14.8)


 


Platelet Count


  


  382 K/UL


(150-450)


 


Mean Platelet Volume


  


  5.5 FL


(6.5-10.1)  L


 


Neutrophils (%) (Auto)


  


  63.2 %


(45.0-75.0)


 


Lymphocytes (%) (Auto)


  


  29.4 %


(20.0-45.0)


 


Monocytes (%) (Auto)


  


  4.4 %


(1.0-10.0)


 


Eosinophils (%) (Auto)


  


  1.9 %


(0.0-3.0)


 


Basophils (%) (Auto)


  


  1.0 %


(0.0-2.0)


 


Prothrombin Time


  


  10.3 SEC


(9.30-11.50)


 


Prothromb Time International


Ratio 


  1.0 (0.9-1.1)  


 


 


Activated Partial


Thromboplast Time 


  31 SEC (23-33)


 


 


C-Reactive Protein,


Quantitative 


  4.8 mg/dL


(0.00-0.90)  H


 


Triglycerides Level


  


  68 MG/DL


()


 


Cholesterol Level


  


  196 MG/DL (<


200)


 


LDL Cholesterol


  


  117 mg/dL


(<100)  H


 


HDL Cholesterol


  


  64 MG/DL


(40-60)  H


 


Cholesterol/HDL Ratio


  


  3.1 (3.3-4.4)


L


 


Thyroid Stimulating Hormone


(TSH) 


  1.840 uiU/mL


(0.358-3.740)











Microbiology








 Date/Time


Source Procedure


Growth Status


 


 


 6/22/18 10:02


Blood Blood Culture - Preliminary


NO GROWTH AFTER 24 HOURS Resulted


 


 6/22/18 10:00


Blood Blood Culture - Preliminary


NO GROWTH AFTER 24 HOURS Resulted

















Roshan Lopez M.D. Jun 23, 2018 13:36

## 2018-06-24 VITALS — DIASTOLIC BLOOD PRESSURE: 78 MMHG | SYSTOLIC BLOOD PRESSURE: 141 MMHG

## 2018-06-24 VITALS — DIASTOLIC BLOOD PRESSURE: 73 MMHG | SYSTOLIC BLOOD PRESSURE: 141 MMHG

## 2018-06-24 VITALS — DIASTOLIC BLOOD PRESSURE: 88 MMHG | SYSTOLIC BLOOD PRESSURE: 156 MMHG

## 2018-06-24 VITALS — SYSTOLIC BLOOD PRESSURE: 125 MMHG | DIASTOLIC BLOOD PRESSURE: 68 MMHG

## 2018-06-24 VITALS — DIASTOLIC BLOOD PRESSURE: 75 MMHG | SYSTOLIC BLOOD PRESSURE: 127 MMHG

## 2018-06-24 VITALS — DIASTOLIC BLOOD PRESSURE: 68 MMHG | SYSTOLIC BLOOD PRESSURE: 119 MMHG

## 2018-06-24 LAB
ADD MANUAL DIFF: NO
ANION GAP SERPL CALC-SCNC: 9 MMOL/L (ref 5–15)
BASOPHILS NFR BLD AUTO: 0.9 % (ref 0–2)
BUN SERPL-MCNC: 17 MG/DL (ref 7–18)
CALCIUM SERPL-MCNC: 9.2 MG/DL (ref 8.5–10.1)
CHLORIDE SERPL-SCNC: 107 MMOL/L (ref 98–107)
CO2 SERPL-SCNC: 23 MMOL/L (ref 21–32)
CREAT SERPL-MCNC: 0.9 MG/DL (ref 0.55–1.3)
EOSINOPHIL NFR BLD AUTO: 2.8 % (ref 0–3)
ERYTHROCYTE [DISTWIDTH] IN BLOOD BY AUTOMATED COUNT: 12.8 % (ref 11.6–14.8)
HCT VFR BLD CALC: 36.2 % (ref 37–47)
HGB BLD-MCNC: 11.9 G/DL (ref 12–16)
LYMPHOCYTES NFR BLD AUTO: 33 % (ref 20–45)
MCV RBC AUTO: 90 FL (ref 80–99)
MONOCYTES NFR BLD AUTO: 4.7 % (ref 1–10)
NEUTROPHILS NFR BLD AUTO: 58.5 % (ref 45–75)
PLATELET # BLD: 349 K/UL (ref 150–450)
POTASSIUM SERPL-SCNC: 4.2 MMOL/L (ref 3.5–5.1)
RBC # BLD AUTO: 4.03 M/UL (ref 4.2–5.4)
SODIUM SERPL-SCNC: 139 MMOL/L (ref 136–145)
WBC # BLD AUTO: 7.2 K/UL (ref 4.8–10.8)

## 2018-06-24 RX ADMIN — HEPARIN SODIUM SCH UNITS: 5000 INJECTION INTRAVENOUS; SUBCUTANEOUS at 20:53

## 2018-06-24 RX ADMIN — HEPARIN SODIUM SCH UNITS: 5000 INJECTION INTRAVENOUS; SUBCUTANEOUS at 08:17

## 2018-06-24 RX ADMIN — ASPIRIN 81 MG SCH MG: 81 TABLET ORAL at 08:16

## 2018-06-24 NOTE — INTERNAL MED PROGRESS NOTE
Subjective


Date of Service:  Jun 24, 2018


Physician Name


Talamantes,Nnamdi


Attending Physician


Josue Arnold MD





Current Medications








 Medications


  (Trade)  Dose


 Ordered  Sig/Ellis


 Route


 PRN Reason  Start Time


 Stop Time Status Last Admin


Dose Admin


 


 Acetaminophen


  (Tylenol)  650 mg  Q4H  PRN


 ORAL


 FEVER  6/22/18 10:00


 7/22/18 09:59   


 


 


 Albuterol/


 Ipratropium


  (Albuterol/


 Ipratropium)  3 ml  Q4H  PRN


 HHN


 Shortness of Breath  6/22/18 11:15


 6/27/18 11:14   


 


 


 Aspirin


  (ASA)  162 mg  DAILY


 ORAL


   6/23/18 09:00


 7/23/18 08:59  6/24/18 08:16


 


 


 Diltiazem HCl


  (Cardizem)  10 mg  Q1H  PRN


 IV


 heart rate more than 120  6/22/18 10:00


 7/22/18 09:59   


 


 


 Enalaprilat


  (Vasotec)  2.5 mg  Q6H  PRN


 IV


 sbp more than 160  6/22/18 10:00


 7/22/18 09:59   


 


 


 Heparin Sodium


  (Porcine)


  (Heparin 5000


 units/ml)  5,000 units  EVERY 12  HOURS


 SUBQ


   6/22/18 21:00


 7/22/18 20:59  6/24/18 08:17


 


 


 Ketorolac


 Tromethamine


  (Toradol 30mg)  30 mg  Q6H  PRN


 IV


 moderate pain ( 4-6)  6/22/18 10:00


 6/27/18 09:59  6/22/18 13:58


 


 


 Levofloxacin  100 ml @ 


 100 mls/hr  Q24H


 IVPB


   6/22/18 13:00


 6/29/18 12:59  6/24/18 12:52


 


 


 Nitroglycerin


  (Ntg)  0.4 mg  Q5M PRN


 SL


 Prn Chest Pain  6/22/18 10:00


 7/22/18 09:59   


 


 


 Ondansetron HCl


  (Zofran)  4 mg  Q6H  PRN


 IVP


 Nausea & Vomiting  6/22/18 10:00


 7/22/18 09:59   


 


 


 Polyethylene


 Glycol


  (Miralax)  17 gm  DAILYPRN  PRN


 ORAL


 Constipation  6/22/18 10:00


 7/22/18 09:59   


 


 


 Promethazine HCl/


 Codeine


  (Phenergan with


 Codeine)  5 ml  Q4H  PRN


 ORAL


 For Cough  6/22/18 11:15


 7/22/18 11:14   


 


 


 Temazepam


  (Restoril)  15 mg  HSPRN  PRN


 ORAL


 Insomnia  6/22/18 10:00


 6/29/18 09:59   


 








Allergies:  


Coded Allergies:  


     TRAMADOL (Verified  Allergy, Unknown, 6/22/18)


ROS Limited/Unobtainable:  No


Constitutional:  Reports: no symptoms


HEENT:  Reports: no symptoms


Cardiovascular:  Reports: no symptoms


Respiratory:  Reports: no symptoms


Gastrointestinal/Abdominal:  Reports: no symptoms


Genitourinary:  Reports: no symptoms


Neurologic/Psychiatric:  Reports: no symptoms


Subjective


52 YO F admitted with chest pain.  Cover for Int Med - Dr Arnold.  Await cardiac 

stress test





Objective





Last Vital Signs








  Date Time  Temp Pulse Resp B/P (MAP) Pulse Ox O2 Delivery O2 Flow Rate FiO2


 


6/24/18 16:00 98.0 74 18 127/75 96 Room Air  





 98.0       











Laboratory Tests








Test


  6/24/18


06:40


 


White Blood Count


  7.2 K/UL


(4.8-10.8)


 


Red Blood Count


  4.03 M/UL


(4.20-5.40)  L


 


Hemoglobin


  11.9 G/DL


(12.0-16.0)  L


 


Hematocrit


  36.2 %


(37.0-47.0)  L


 


Mean Corpuscular Volume 90 FL (80-99)  


 


Mean Corpuscular Hemoglobin


  29.6 PG


(27.0-31.0)


 


Mean Corpuscular Hemoglobin


Concent 33.0 G/DL


(32.0-36.0)


 


Red Cell Distribution Width


  12.8 %


(11.6-14.8)


 


Platelet Count


  349 K/UL


(150-450)


 


Mean Platelet Volume


  5.8 FL


(6.5-10.1)  L


 


Neutrophils (%) (Auto)


  58.5 %


(45.0-75.0)


 


Lymphocytes (%) (Auto)


  33.0 %


(20.0-45.0)


 


Monocytes (%) (Auto)


  4.7 %


(1.0-10.0)


 


Eosinophils (%) (Auto)


  2.8 %


(0.0-3.0)


 


Basophils (%) (Auto)


  0.9 %


(0.0-2.0)


 


Sodium Level


  139 MMOL/L


(136-145)


 


Potassium Level


  4.2 MMOL/L


(3.5-5.1)


 


Chloride Level


  107 MMOL/L


()


 


Carbon Dioxide Level


  23 MMOL/L


(21-32)


 


Anion Gap


  9 mmol/L


(5-15)


 


Blood Urea Nitrogen


  17 mg/dL


(7-18)


 


Creatinine


  0.9 MG/DL


(0.55-1.30)


 


Estimat Glomerular Filtration


Rate > 60 mL/min


(>60)


 


Glucose Level


  99 MG/DL


()


 


Calcium Level


  9.2 MG/DL


(8.5-10.1)


 


Troponin I


  0.000 ng/mL


(0.000-0.056)











Microbiology








 Date/Time


Source Procedure


Growth Status


 


 


 6/22/18 10:02


Blood Blood Culture - Preliminary


NO GROWTH AFTER 24 HOURS Resulted


 


 6/22/18 10:00


Blood Blood Culture - Preliminary


NO GROWTH AFTER 24 HOURS Resulted

















Intake and Output  


 


 6/23/18 6/24/18





 19:00 07:00


 


Intake Total 700 ml 250 ml


 


Balance 700 ml 250 ml


 


  


 


Intake Oral 600 ml 250 ml


 


IV Total 100 ml 


 


# Voids 3 3








Objective


General Appearance:  WD/WN, no apparent distress, alert


EENT:  PERRL/EOMI, normal ENT inspection


Neck:  non-tender, normal alignment, supple


Cardiovascular:  normal peripheral pulses, normal rate, regular rhythm, no 

gallop/murmur, no JVD


Respiratory/Chest:  chest wall non-tender, lungs clear, normal breath sounds, 

no respiratory distress, no accessory muscle use


Abdomen:  normal bowel sounds, non tender, soft, no organomegaly, no mass


Extremities:  normal range of motion, non-tender


Neurologic:  CNs II-XII grossly normal, no motor/sensory deficits


Skin:  normal pigmentation, warm/dry





Assessment/Plan


Problem List:  


(1) Arthritis


(2) Obesity


(3) Asthma


Assessment & Plan:  Continue levaquin per pulmonary





(4) Epigastric abdominal pain


(5) Chest pain


Assessment & Plan:  See cardiology note.  Await cardiac stress test














Nnamdi Talamantes MD Jun 24, 2018 17:26

## 2018-06-24 NOTE — CARDIOLOGY PROGRESS NOTE
Assessment/Plan


Status:  stable, progressing


Assessment/Plan


1-Aspirin


2-Statin


3-H pylori testing


4- Omeprazole


5 -EKG/Echo reviewed, troponin negative, CTA negative


6 -Plan for stress test in AM





Subjective


Cardiovascular:  Reports: no symptoms


Respiratory:  Reports: no symptoms


Gastrointestinal/Abdominal:  Reports: no symptoms


Genitourinary:  Reports: no symptoms


Subjective


No acute events


Troponin negative


CTA negative for PE


TTe with preserved function


No current chest pain





Objective





Last 24 Hour Vital Signs








  Date Time  Temp Pulse Resp B/P (MAP) Pulse Ox O2 Delivery O2 Flow Rate FiO2


 


6/24/18 08:00 98.0 72 17 141/78 95 Room Air  





 98.0       


 


6/24/18 08:00  80      


 


6/24/18 04:00  73      


 


6/24/18 04:00 97.8 76 19 119/68 96 Room Air  





 97.8       


 


6/24/18 00:00 98.1 80 20 125/68 97 Room Air  





 98.1       


 


6/24/18 00:00  80      


 


6/23/18 20:00 98.4 84 20 131/71 97 Room Air  





 98.4       


 


6/23/18 20:00  86      


 


6/23/18 16:00  85      


 


6/23/18 16:00 98.1 76 21 122/72 96 Room Air  





 98.1       


 


6/23/18 12:00 97.9 81 20 124/71 95 Room Air  





 97.9       


 


6/23/18 12:00  76      








General Appearance:  no apparent distress


EENT:  PERRL/EOMI


Neck:  non-tender


Rhythm:  NSR


Cardiovascular:  normal peripheral pulses


Respiratory/Chest:  chest wall non-tender


Abdomen:  normal bowel sounds


Extremities:  normal range of motion


Neurologic:  CNs II-XII grossly normal











Intake and Output  


 


 6/23/18 6/24/18





 19:00 07:00


 


Intake Total 700 ml 250 ml


 


Balance 700 ml 250 ml


 


  


 


Intake Oral 600 ml 250 ml


 


IV Total 100 ml 


 


# Voids 3 3











Laboratory Tests








Test


  6/24/18


06:40


 


White Blood Count


  7.2 K/UL


(4.8-10.8)


 


Red Blood Count


  4.03 M/UL


(4.20-5.40)  L


 


Hemoglobin


  11.9 G/DL


(12.0-16.0)  L


 


Hematocrit


  36.2 %


(37.0-47.0)  L


 


Mean Corpuscular Volume 90 FL (80-99)  


 


Mean Corpuscular Hemoglobin


  29.6 PG


(27.0-31.0)


 


Mean Corpuscular Hemoglobin


Concent 33.0 G/DL


(32.0-36.0)


 


Red Cell Distribution Width


  12.8 %


(11.6-14.8)


 


Platelet Count


  349 K/UL


(150-450)


 


Mean Platelet Volume


  5.8 FL


(6.5-10.1)  L


 


Neutrophils (%) (Auto)


  58.5 %


(45.0-75.0)


 


Lymphocytes (%) (Auto)


  33.0 %


(20.0-45.0)


 


Monocytes (%) (Auto)


  4.7 %


(1.0-10.0)


 


Eosinophils (%) (Auto)


  2.8 %


(0.0-3.0)


 


Basophils (%) (Auto)


  0.9 %


(0.0-2.0)


 


Sodium Level


  139 MMOL/L


(136-145)


 


Potassium Level


  4.2 MMOL/L


(3.5-5.1)


 


Chloride Level


  107 MMOL/L


()


 


Carbon Dioxide Level


  23 MMOL/L


(21-32)


 


Anion Gap


  9 mmol/L


(5-15)


 


Blood Urea Nitrogen


  17 mg/dL


(7-18)


 


Creatinine


  0.9 MG/DL


(0.55-1.30)


 


Estimat Glomerular Filtration


Rate > 60 mL/min


(>60)


 


Glucose Level


  99 MG/DL


()


 


Calcium Level


  9.2 MG/DL


(8.5-10.1)


 


Troponin I


  0.000 ng/mL


(0.000-0.056)











Microbiology








 Date/Time


Source Procedure


Growth Status


 


 


 6/22/18 10:02


Blood Blood Culture - Preliminary


NO GROWTH AFTER 24 HOURS Resulted


 


 6/22/18 10:00


Blood Blood Culture - Preliminary


NO GROWTH AFTER 24 HOURS Resulted

















Roshan Lopez M.D. Jun 24, 2018 10:44

## 2018-06-25 VITALS — SYSTOLIC BLOOD PRESSURE: 110 MMHG | DIASTOLIC BLOOD PRESSURE: 65 MMHG

## 2018-06-25 VITALS — DIASTOLIC BLOOD PRESSURE: 77 MMHG | SYSTOLIC BLOOD PRESSURE: 129 MMHG

## 2018-06-25 VITALS — DIASTOLIC BLOOD PRESSURE: 59 MMHG | SYSTOLIC BLOOD PRESSURE: 102 MMHG

## 2018-06-25 LAB
ADD MANUAL DIFF: NO
ANION GAP SERPL CALC-SCNC: 9 MMOL/L (ref 5–15)
BASOPHILS NFR BLD AUTO: 0.8 % (ref 0–2)
BUN SERPL-MCNC: 16 MG/DL (ref 7–18)
CALCIUM SERPL-MCNC: 9.4 MG/DL (ref 8.5–10.1)
CHLORIDE SERPL-SCNC: 106 MMOL/L (ref 98–107)
CO2 SERPL-SCNC: 25 MMOL/L (ref 21–32)
CREAT SERPL-MCNC: 0.9 MG/DL (ref 0.55–1.3)
EOSINOPHIL NFR BLD AUTO: 2.2 % (ref 0–3)
ERYTHROCYTE [DISTWIDTH] IN BLOOD BY AUTOMATED COUNT: 13.2 % (ref 11.6–14.8)
HCT VFR BLD CALC: 38 % (ref 37–47)
HGB BLD-MCNC: 12.4 G/DL (ref 12–16)
LYMPHOCYTES NFR BLD AUTO: 29.2 % (ref 20–45)
MCV RBC AUTO: 90 FL (ref 80–99)
MONOCYTES NFR BLD AUTO: 5.1 % (ref 1–10)
NEUTROPHILS NFR BLD AUTO: 62.7 % (ref 45–75)
PLATELET # BLD: 364 K/UL (ref 150–450)
POTASSIUM SERPL-SCNC: 4.2 MMOL/L (ref 3.5–5.1)
RBC # BLD AUTO: 4.22 M/UL (ref 4.2–5.4)
SODIUM SERPL-SCNC: 140 MMOL/L (ref 136–145)
WBC # BLD AUTO: 7.1 K/UL (ref 4.8–10.8)

## 2018-06-25 RX ADMIN — HEPARIN SODIUM SCH UNITS: 5000 INJECTION INTRAVENOUS; SUBCUTANEOUS at 08:28

## 2018-06-25 RX ADMIN — HEPARIN SODIUM SCH UNITS: 5000 INJECTION INTRAVENOUS; SUBCUTANEOUS at 20:51

## 2018-06-25 RX ADMIN — ASPIRIN 81 MG SCH MG: 81 TABLET ORAL at 08:27

## 2018-06-25 NOTE — INTERNAL MED PROGRESS NOTE
Subjective


Date of Service:  Jun 25, 2018


Physician Name


TalamantesNnamdi


Attending Physician


Josue Arnold MD





Current Medications








 Medications


  (Trade)  Dose


 Ordered  Sig/Ellis


 Route


 PRN Reason  Start Time


 Stop Time Status Last Admin


Dose Admin


 


 Acetaminophen


  (Tylenol)  650 mg  Q4H  PRN


 ORAL


 FEVER  6/22/18 10:00


 7/22/18 09:59   


 


 


 Albuterol/


 Ipratropium


  (Albuterol/


 Ipratropium)  3 ml  Q4H  PRN


 HHN


 Shortness of Breath  6/22/18 11:15


 6/27/18 11:14   


 


 


 Aspirin


  (ASA)  162 mg  DAILY


 ORAL


   6/23/18 09:00


 7/23/18 08:59  6/25/18 08:27


 


 


 Diltiazem HCl


  (Cardizem)  10 mg  Q1H  PRN


 IV


 heart rate more than 120  6/22/18 10:00


 7/22/18 09:59   


 


 


 Enalaprilat


  (Vasotec)  2.5 mg  Q6H  PRN


 IV


 sbp more than 160  6/22/18 10:00


 7/22/18 09:59   


 


 


 Heparin Sodium


  (Porcine)


  (Heparin 5000


 units/ml)  5,000 units  EVERY 12  HOURS


 SUBQ


   6/22/18 21:00


 7/22/18 20:59  6/25/18 08:28


 


 


 Ketorolac


 Tromethamine


  (Toradol 30mg)  30 mg  Q6H  PRN


 IV


 moderate pain ( 4-6)  6/22/18 10:00


 6/27/18 09:59  6/22/18 13:58


 


 


 Levofloxacin  100 ml @ 


 100 mls/hr  Q24H


 IVPB


   6/22/18 13:00


 6/29/18 12:59  6/25/18 14:45


 


 


 Nitroglycerin


  (Ntg)  0.4 mg  Q5M PRN


 SL


 Prn Chest Pain  6/22/18 10:00


 7/22/18 09:59   


 


 


 Ondansetron HCl


  (Zofran)  4 mg  Q6H  PRN


 IVP


 Nausea & Vomiting  6/22/18 10:00


 7/22/18 09:59   


 


 


 Polyethylene


 Glycol


  (Miralax)  17 gm  DAILYPRN  PRN


 ORAL


 Constipation  6/22/18 10:00


 7/22/18 09:59   


 


 


 Promethazine HCl/


 Codeine


  (Phenergan with


 Codeine)  5 ml  Q4H  PRN


 ORAL


 For Cough  6/22/18 11:15


 7/22/18 11:14   


 


 


 Temazepam


  (Restoril)  15 mg  HSPRN  PRN


 ORAL


 Insomnia  6/22/18 10:00


 6/29/18 09:59   


 








Allergies:  


Coded Allergies:  


     TRAMADOL (Verified  Allergy, Unknown, 6/22/18)


ROS Limited/Unobtainable:  No


Constitutional:  Reports: no symptoms


HEENT:  Reports: no symptoms


Cardiovascular:  Reports: chest pain


Respiratory:  Reports: no symptoms


Gastrointestinal/Abdominal:  Reports: no symptoms


Genitourinary:  Reports: no symptoms


Neurologic/Psychiatric:  Reports: no symptoms


Subjective


54 YO F admitted with chest pain.  Cover for Int Med - Dr Arnold.  Await cardiac 

stress test





Objective





Last Vital Signs








  Date Time  Temp Pulse Resp B/P (MAP) Pulse Ox O2 Delivery O2 Flow Rate FiO2


 


6/25/18 12:00  82      


 


6/25/18 07:54   20   Room Air  21


 


6/25/18 04:00 97.2   102/59 95   





 97.2       











Laboratory Tests








Test


  6/25/18


07:25


 


White Blood Count


  7.1 K/UL


(4.8-10.8)


 


Red Blood Count


  4.22 M/UL


(4.20-5.40)


 


Hemoglobin


  12.4 G/DL


(12.0-16.0)


 


Hematocrit


  38.0 %


(37.0-47.0)


 


Mean Corpuscular Volume 90 FL (80-99)  


 


Mean Corpuscular Hemoglobin


  29.4 PG


(27.0-31.0)


 


Mean Corpuscular Hemoglobin


Concent 32.7 G/DL


(32.0-36.0)


 


Red Cell Distribution Width


  13.2 %


(11.6-14.8)


 


Platelet Count


  364 K/UL


(150-450)


 


Mean Platelet Volume


  5.6 FL


(6.5-10.1)  L


 


Neutrophils (%) (Auto)


  62.7 %


(45.0-75.0)


 


Lymphocytes (%) (Auto)


  29.2 %


(20.0-45.0)


 


Monocytes (%) (Auto)


  5.1 %


(1.0-10.0)


 


Eosinophils (%) (Auto)


  2.2 %


(0.0-3.0)


 


Basophils (%) (Auto)


  0.8 %


(0.0-2.0)


 


Sodium Level


  140 MMOL/L


(136-145)


 


Potassium Level


  4.2 MMOL/L


(3.5-5.1)


 


Chloride Level


  106 MMOL/L


()


 


Carbon Dioxide Level


  25 MMOL/L


(21-32)


 


Anion Gap


  9 mmol/L


(5-15)


 


Blood Urea Nitrogen


  16 mg/dL


(7-18)


 


Creatinine


  0.9 MG/DL


(0.55-1.30)


 


Estimat Glomerular Filtration


Rate > 60 mL/min


(>60)


 


Glucose Level


  113 MG/DL


()  H


 


Calcium Level


  9.4 MG/DL


(8.5-10.1)

















Intake and Output  


 


 6/24/18 6/25/18





 19:00 07:00


 


Intake Total 100 ml 


 


Balance 100 ml 


 


  


 


IV Total 100 ml 


 


# Voids 3 


 


# Bowel Movements 1 








Objective


General Appearance:  WD/WN, no apparent distress, alert


EENT:  PERRL/EOMI, normal ENT inspection


Neck:  non-tender, normal alignment, supple


Cardiovascular:  normal peripheral pulses, normal rate, regular rhythm, no 

gallop/murmur, no JVD


Respiratory/Chest:  chest wall non-tender, lungs clear, normal breath sounds, 

no respiratory distress, no accessory muscle use


Abdomen:  normal bowel sounds, non tender, soft, no organomegaly, no mass


Extremities:  normal range of motion, non-tender


Neurologic:  CNs II-XII grossly normal, no motor/sensory deficits


Skin:  normal pigmentation, warm/dry





Assessment/Plan


Problem List:  


(1) Arthritis


(2) Obesity


(3) Asthma


Assessment & Plan:  Continue levaquin per pulmonary





(4) Epigastric abdominal pain


(5) Chest pain


Assessment & Plan:  See cardiology note.  Await cardiac stress test





Status:  stable











Nnamdi Talamantes MD Jun 25, 2018 18:34

## 2018-06-25 NOTE — CARDIOLOGY PROGRESS NOTE
Assessment/Plan


Status:  stable


Assessment/Plan


1-Aspirin


2-Statin


3-H pylori testing


4- Omeprazole


5 -EKG/Echo reviewed, troponin negative, CTA negative


6 -Plan for stress test in AM





Subjective


Cardiovascular:  Reports: no symptoms


Respiratory:  Reports: no symptoms


Gastrointestinal/Abdominal:  Reports: no symptoms


Genitourinary:  Reports: no symptoms


Subjective


No acute events


Troponin negative


CTA negative for PE


TTe with preserved function


No current chest pain





Objective





Last 24 Hour Vital Signs








  Date Time  Temp Pulse Resp B/P (MAP) Pulse Ox O2 Delivery O2 Flow Rate FiO2


 


6/25/18 12:00  82      


 


6/25/18 08:00  69      


 


6/25/18 07:54  67 20   Room Air  21


 


6/25/18 04:00 97.2 75 18 102/59 95 Room Air  





 97.2       


 


6/25/18 04:00  74      


 


6/25/18 00:00  81      


 


6/25/18 00:00 97.0 93 20 110/65 96 Room Air  





 97.0       


 


6/24/18 20:00 97.2 90 18 141/73 96   





 97.2       


 


6/24/18 20:00  101      


 


6/24/18 16:00  92      


 


6/24/18 16:00 98.0 74 18 127/75 96 Room Air  





 98.0       








General Appearance:  no apparent distress


EENT:  PERRL/EOMI


Neck:  non-tender


Rhythm:  NSR


Cardiovascular:  normal peripheral pulses


Respiratory/Chest:  chest wall non-tender


Abdomen:  normal bowel sounds


Extremities:  normal range of motion


Neurologic:  CNs II-XII grossly normal











Intake and Output  


 


 6/24/18 6/25/18





 19:00 07:00


 


Intake Total 100 ml 


 


Balance 100 ml 


 


  


 


IV Total 100 ml 


 


# Voids 3 


 


# Bowel Movements 1 











Laboratory Tests








Test


  6/25/18


07:25


 


White Blood Count


  7.1 K/UL


(4.8-10.8)


 


Red Blood Count


  4.22 M/UL


(4.20-5.40)


 


Hemoglobin


  12.4 G/DL


(12.0-16.0)


 


Hematocrit


  38.0 %


(37.0-47.0)


 


Mean Corpuscular Volume 90 FL (80-99)  


 


Mean Corpuscular Hemoglobin


  29.4 PG


(27.0-31.0)


 


Mean Corpuscular Hemoglobin


Concent 32.7 G/DL


(32.0-36.0)


 


Red Cell Distribution Width


  13.2 %


(11.6-14.8)


 


Platelet Count


  364 K/UL


(150-450)


 


Mean Platelet Volume


  5.6 FL


(6.5-10.1)  L


 


Neutrophils (%) (Auto)


  62.7 %


(45.0-75.0)


 


Lymphocytes (%) (Auto)


  29.2 %


(20.0-45.0)


 


Monocytes (%) (Auto)


  5.1 %


(1.0-10.0)


 


Eosinophils (%) (Auto)


  2.2 %


(0.0-3.0)


 


Basophils (%) (Auto)


  0.8 %


(0.0-2.0)


 


Sodium Level


  140 MMOL/L


(136-145)


 


Potassium Level


  4.2 MMOL/L


(3.5-5.1)


 


Chloride Level


  106 MMOL/L


()


 


Carbon Dioxide Level


  25 MMOL/L


(21-32)


 


Anion Gap


  9 mmol/L


(5-15)


 


Blood Urea Nitrogen


  16 mg/dL


(7-18)


 


Creatinine


  0.9 MG/DL


(0.55-1.30)


 


Estimat Glomerular Filtration


Rate > 60 mL/min


(>60)


 


Glucose Level


  113 MG/DL


()  H


 


Calcium Level


  9.4 MG/DL


(8.5-10.1)

















Roshan Lopez M.D. Jun 25, 2018 12:40

## 2018-06-26 VITALS — DIASTOLIC BLOOD PRESSURE: 42 MMHG | SYSTOLIC BLOOD PRESSURE: 108 MMHG

## 2018-06-26 VITALS — SYSTOLIC BLOOD PRESSURE: 116 MMHG | DIASTOLIC BLOOD PRESSURE: 62 MMHG

## 2018-06-26 VITALS — DIASTOLIC BLOOD PRESSURE: 76 MMHG | SYSTOLIC BLOOD PRESSURE: 138 MMHG

## 2018-06-26 VITALS — SYSTOLIC BLOOD PRESSURE: 108 MMHG | DIASTOLIC BLOOD PRESSURE: 62 MMHG

## 2018-06-26 LAB
ADD MANUAL DIFF: NO
ANION GAP SERPL CALC-SCNC: 8 MMOL/L (ref 5–15)
BASOPHILS NFR BLD AUTO: 1.1 % (ref 0–2)
BUN SERPL-MCNC: 20 MG/DL (ref 7–18)
CALCIUM SERPL-MCNC: 9.1 MG/DL (ref 8.5–10.1)
CHLORIDE SERPL-SCNC: 107 MMOL/L (ref 98–107)
CO2 SERPL-SCNC: 26 MMOL/L (ref 21–32)
CREAT SERPL-MCNC: 0.9 MG/DL (ref 0.55–1.3)
EOSINOPHIL NFR BLD AUTO: 3.1 % (ref 0–3)
ERYTHROCYTE [DISTWIDTH] IN BLOOD BY AUTOMATED COUNT: 13.1 % (ref 11.6–14.8)
HCT VFR BLD CALC: 36 % (ref 37–47)
HGB BLD-MCNC: 11.9 G/DL (ref 12–16)
LYMPHOCYTES NFR BLD AUTO: 34 % (ref 20–45)
MCV RBC AUTO: 90 FL (ref 80–99)
MONOCYTES NFR BLD AUTO: 4.5 % (ref 1–10)
NEUTROPHILS NFR BLD AUTO: 57.2 % (ref 45–75)
PLATELET # BLD: 378 K/UL (ref 150–450)
POTASSIUM SERPL-SCNC: 4.2 MMOL/L (ref 3.5–5.1)
RBC # BLD AUTO: 4.02 M/UL (ref 4.2–5.4)
SODIUM SERPL-SCNC: 140 MMOL/L (ref 136–145)
WBC # BLD AUTO: 6.8 K/UL (ref 4.8–10.8)

## 2018-06-26 RX ADMIN — HEPARIN SODIUM SCH UNITS: 5000 INJECTION INTRAVENOUS; SUBCUTANEOUS at 08:55

## 2018-06-26 RX ADMIN — ASPIRIN 81 MG SCH MG: 81 TABLET ORAL at 08:54

## 2018-06-26 NOTE — DIAGNOSTIC IMAGING REPORT
Indications: Chest pain

 

Technique: Single day single isotope protocol utilized. Initially, resting images

obtained using IV administration 9.9 millicuries 99M technetium Myoview.

Subsequently, patient underwent  lexiscan stress testing. See cardiology report for

details. During Lexiscan infusion, IV administration 31.4 mCi 99 M technetium

Myoview. SPECT and planar images obtained. SPECT images gated to 8 phases of the

cardiac cycle were also obtained, and reformatted into cine images for evaluation of

ejection fraction.

 

Comparison: none

 

Findings: Per cardiology report, patient experienced no significant symptoms. Per

cardiology report, resting EKG demonstrates normal sinus rhythm. No ST changes noted

during infusion.  Imaging demonstrates equivocal small area of slight decreased

activity in the apex, inferolateral and anteroseptal walls which is unchanged on the

resting images . Calculated post stress ejection fraction 54%. No focal wall motion

abnormality. The left ventricle is mildly dilated

 

Impression: Nonischemic clinical response to pharmacologic stress, per cardiology

report

 

Nonischemic electrocardiographic response to pharmacologic stress, per cardiology

report

 

Equivocal small fixed perfusion defect at the apex extending into the anteroseptal

and inferolateral walls. A be an artifact of soft tissue attenuation or physiologic

thinning, but could represent a small infarct. No evidence of ischemia, at level of

stress achieved.

 

Calculated post stress ejection fraction 54%

## 2018-06-26 NOTE — INTERNAL MED PROGRESS NOTE
Subjective


Date of Service:  Jun 26, 2018


Physician Name


Talamantes,Nnamdi


Attending Physician


Josue Arnold MD





Current Medications








 Medications


  (Trade)  Dose


 Ordered  Sig/Ellis


 Route


 PRN Reason  Start Time


 Stop Time Status Last Admin


Dose Admin


 


 Acetaminophen


  (Tylenol)  650 mg  Q4H  PRN


 ORAL


 FEVER  6/22/18 10:00


 7/22/18 09:59   


 


 


 Albuterol/


 Ipratropium


  (Albuterol/


 Ipratropium)  3 ml  Q4H  PRN


 HHN


 Shortness of Breath  6/22/18 11:15


 6/27/18 11:14   


 


 


 Aspirin


  (ASA)  162 mg  DAILY


 ORAL


   6/23/18 09:00


 7/23/18 08:59  6/26/18 08:54


 


 


 Diltiazem HCl


  (Cardizem)  10 mg  Q1H  PRN


 IV


 heart rate more than 120  6/22/18 10:00


 7/22/18 09:59   


 


 


 Enalaprilat


  (Vasotec)  2.5 mg  Q6H  PRN


 IV


 sbp more than 160  6/22/18 10:00


 7/22/18 09:59   


 


 


 Heparin Sodium


  (Porcine)


  (Heparin 5000


 units/ml)  5,000 units  EVERY 12  HOURS


 SUBQ


   6/22/18 21:00


 7/22/18 20:59  6/26/18 08:55


 


 


 Ketorolac


 Tromethamine


  (Toradol 30mg)  30 mg  Q6H  PRN


 IV


 moderate pain ( 4-6)  6/22/18 10:00


 6/27/18 09:59  6/22/18 13:58


 


 


 Levofloxacin


  (Levaquin)  500 mg  Q24H


 ORAL


   6/27/18 13:00


 6/29/18 12:59   


 


 


 Nitroglycerin


  (Ntg)  0.4 mg  Q5M PRN


 SL


 Prn Chest Pain  6/22/18 10:00


 7/22/18 09:59   


 


 


 Ondansetron HCl


  (Zofran)  4 mg  Q6H  PRN


 IVP


 Nausea & Vomiting  6/22/18 10:00


 7/22/18 09:59   


 


 


 Polyethylene


 Glycol


  (Miralax)  17 gm  DAILYPRN  PRN


 ORAL


 Constipation  6/22/18 10:00


 7/22/18 09:59   


 


 


 Promethazine HCl/


 Codeine


  (Phenergan with


 Codeine)  5 ml  Q4H  PRN


 ORAL


 For Cough  6/22/18 11:15


 7/22/18 11:14   


 


 


 Regadenoson


  (Lexiscan)  0.4 mg  ONCE  PRN


 IV


 STRESS TEST  6/26/18 09:15


 6/26/18 18:00   


 


 


 Temazepam


  (Restoril)  15 mg  HSPRN  PRN


 ORAL


 Insomnia  6/22/18 10:00


 6/29/18 09:59   


 








Allergies:  


Coded Allergies:  


     TRAMADOL (Verified  Allergy, Unknown, 6/22/18)


ROS Limited/Unobtainable:  No


Constitutional:  Reports: no symptoms


HEENT:  Reports: no symptoms


Cardiovascular:  Reports: chest pain


Respiratory:  Reports: no symptoms


Gastrointestinal/Abdominal:  Reports: no symptoms


Genitourinary:  Reports: no symptoms


Neurologic/Psychiatric:  Reports: no symptoms


Subjective


52 YO F admitted with chest pain.  Cover for Int Med - Dr Arnold.  Await cardiac 

stress test result





Objective





Last Vital Signs








  Date Time  Temp Pulse Resp B/P (MAP) Pulse Ox O2 Delivery O2 Flow Rate FiO2


 


6/26/18 16:00  85      


 


6/26/18 11:35 97.2  18 138/76 98 Room Air  





 97.2       


 


6/26/18 07:24        21











Laboratory Tests








Test


  6/26/18


06:10


 


White Blood Count


  6.8 K/UL


(4.8-10.8)


 


Red Blood Count


  4.02 M/UL


(4.20-5.40)  L


 


Hemoglobin


  11.9 G/DL


(12.0-16.0)  L


 


Hematocrit


  36.0 %


(37.0-47.0)  L


 


Mean Corpuscular Volume 90 FL (80-99)  


 


Mean Corpuscular Hemoglobin


  29.6 PG


(27.0-31.0)


 


Mean Corpuscular Hemoglobin


Concent 33.1 G/DL


(32.0-36.0)


 


Red Cell Distribution Width


  13.1 %


(11.6-14.8)


 


Platelet Count


  378 K/UL


(150-450)


 


Mean Platelet Volume


  5.7 FL


(6.5-10.1)  L


 


Neutrophils (%) (Auto)


  57.2 %


(45.0-75.0)


 


Lymphocytes (%) (Auto)


  34.0 %


(20.0-45.0)


 


Monocytes (%) (Auto)


  4.5 %


(1.0-10.0)


 


Eosinophils (%) (Auto)


  3.1 %


(0.0-3.0)  H


 


Basophils (%) (Auto)


  1.1 %


(0.0-2.0)


 


Sodium Level


  140 MMOL/L


(136-145)


 


Potassium Level


  4.2 MMOL/L


(3.5-5.1)


 


Chloride Level


  107 MMOL/L


()


 


Carbon Dioxide Level


  26 MMOL/L


(21-32)


 


Anion Gap


  8 mmol/L


(5-15)


 


Blood Urea Nitrogen


  20 mg/dL


(7-18)  H


 


Creatinine


  0.9 MG/DL


(0.55-1.30)


 


Estimat Glomerular Filtration


Rate > 60 mL/min


(>60)


 


Glucose Level


  107 MG/DL


()  H


 


Calcium Level


  9.1 MG/DL


(8.5-10.1)

















Intake and Output  


 


 6/25/18 6/26/18





 19:00 07:00


 


Intake Total 340 ml 


 


Balance 340 ml 


 


  


 


Intake Oral 240 ml 


 


IV Total 100 ml 


 


# Voids 5 


 


# Bowel Movements 2 








Objective


General Appearance:  WD/WN, no apparent distress, alert


EENT:  PERRL/EOMI, normal ENT inspection


Neck:  non-tender, normal alignment, supple


Cardiovascular:  normal peripheral pulses, normal rate, regular rhythm, no 

gallop/murmur, no JVD


Respiratory/Chest:  chest wall non-tender, lungs clear, normal breath sounds, 

no respiratory distress, no accessory muscle use


Abdomen:  normal bowel sounds, non tender, soft, no organomegaly, no mass


Extremities:  normal range of motion, non-tender


Neurologic:  CNs II-XII grossly normal, no motor/sensory deficits


Skin:  normal pigmentation, warm/dry





Assessment/Plan


Problem List:  


(1) Arthritis


(2) Obesity


(3) Asthma


Assessment & Plan:  Continue levaquin per pulmonary





(4) Epigastric abdominal pain


(5) Chest pain


Assessment & Plan:  See cardiology note.  Await cardiac stress test result














Nnamdi Talamantes MD Jun 26, 2018 17:42

## 2018-06-26 NOTE — CONSULTATION
History of Present Illness


General


Date patient seen:  Jun 26, 2018


Chief Complaint:  Abdominal Pain





Present Illness


HPI


53-year-old, -American female, presents with chief complaint of chest 

pain, anxiety and depression. the pt was anxious and chest tightness


Allergies:  


Coded Allergies:  


     TRAMADOL (Verified  Allergy, Unknown, 6/22/18)





Medication History


Scheduled


Cyanocobalamin (Vitamin B-12), MCG ORAL DAILY, (Reported)


Levofloxacin* (Levaquin*), 500 MG ORAL DAILY


Omeprazole (Omeprazole), MG ORAL DAILY, (Reported)





Scheduled PRN


Hydrocodone Bit/Acetaminophen 5-325* (Norco 5-325*), 1 TAB ORAL Q4H PRN





Miscellaneous Medications


Albuterol Sulfate (Ventolin Hfa), PUFF INH, (Reported)


Fexofenadine Hcl (Fexofenadine Hcl), MG PO, (Reported)


Ibuprofen* (Motrin*), MG ORAL, (Reported)


Ipratropium Bromide 0.5MG/2.5ML (Ipratropium Bromide 0.5MG/2.5ML), MG HHN, (

Reported)


Methocarbamol (Methocarbamol), GM MC, (Reported)


Mometasone/Formoterol (Dulera 100 Mcg/5 Mcg Inhaler), 0 INH, (Reported)


Prenatal Vit/Iron Fumarate/Fa (Prenatal Tablet), EACH PO, (Reported)





Patient History


Limited by:  medical condition


History Provided By:  Patient, Medical Record, PMD


Healthcare decision maker





Resuscitation status


Full Code


Advanced Directive on File








Past Medical/Surgical History


Past Medical/Surgical History:  


(1) History of asthma


(2) Costochondritis


(3) GERD (gastroesophageal reflux disease)


(4) Arthritis


(5) Epigastric abdominal pain


(6) Obesity


(7) Asthma





Review of Systems


Psychiatric:  Reports: prior hx, anxiety, depressed feelings, emotional problems





Physical Exam


General Appearance:  no apparent distress, alert


Neurologic:  oriented x 3





Last 24 Hour Vital Signs








  Date Time  Temp Pulse Resp B/P (MAP) Pulse Ox O2 Delivery O2 Flow Rate FiO2


 


6/26/18 16:00  85      


 


6/26/18 12:00  70      


 


6/26/18 11:35 97.2 71 18 138/76 98 Room Air  





 97.2       


 


6/26/18 08:00  63      


 


6/26/18 07:46 97.3 75 18 116/62 96 Room Air  





 97.3       


 


6/26/18 07:24  77 18   Room Air  21


 


6/26/18 04:00  70      


 


6/26/18 04:00 97.7 75 18 108/62 95 Room Air  





 97.7       


 


6/26/18 00:00 97.7 86 20 108/42 98 Room Air  





 97.7       


 


6/26/18 00:00  75      

















Intake and Output  


 


 6/25/18 6/26/18





 19:00 07:00


 


Intake Total 340 ml 


 


Balance 340 ml 


 


  


 


Intake Oral 240 ml 


 


IV Total 100 ml 


 


# Voids 5 


 


# Bowel Movements 2 











Laboratory Tests








Test


  6/26/18


06:10


 


White Blood Count


  6.8 K/UL


(4.8-10.8)


 


Red Blood Count


  4.02 M/UL


(4.20-5.40)  L


 


Hemoglobin


  11.9 G/DL


(12.0-16.0)  L


 


Hematocrit


  36.0 %


(37.0-47.0)  L


 


Mean Corpuscular Volume 90 FL (80-99)  


 


Mean Corpuscular Hemoglobin


  29.6 PG


(27.0-31.0)


 


Mean Corpuscular Hemoglobin


Concent 33.1 G/DL


(32.0-36.0)


 


Red Cell Distribution Width


  13.1 %


(11.6-14.8)


 


Platelet Count


  378 K/UL


(150-450)


 


Mean Platelet Volume


  5.7 FL


(6.5-10.1)  L


 


Neutrophils (%) (Auto)


  57.2 %


(45.0-75.0)


 


Lymphocytes (%) (Auto)


  34.0 %


(20.0-45.0)


 


Monocytes (%) (Auto)


  4.5 %


(1.0-10.0)


 


Eosinophils (%) (Auto)


  3.1 %


(0.0-3.0)  H


 


Basophils (%) (Auto)


  1.1 %


(0.0-2.0)


 


Sodium Level


  140 MMOL/L


(136-145)


 


Potassium Level


  4.2 MMOL/L


(3.5-5.1)


 


Chloride Level


  107 MMOL/L


()


 


Carbon Dioxide Level


  26 MMOL/L


(21-32)


 


Anion Gap


  8 mmol/L


(5-15)


 


Blood Urea Nitrogen


  20 mg/dL


(7-18)  H


 


Creatinine


  0.9 MG/DL


(0.55-1.30)


 


Estimat Glomerular Filtration


Rate > 60 mL/min


(>60)


 


Glucose Level


  107 MG/DL


()  H


 


Calcium Level


  9.1 MG/DL


(8.5-10.1)








Height (Feet):  5


Height (Inches):  4.00


Weight (Pounds):  240





Assessment/Plan


Assessment/Plan


Anxiety d/o





Ativan candis


gave referrals











Aleshia Acosta M.D. Jun 26, 2018 23:10

## 2018-06-27 NOTE — DISCHARGE SUMMARY
Discharge Summary


Discharge Summary


_


DATE OF ADMISSION: 06/22/2018





DATE OF DISCHARGE:06/26/2018





REASON FOR ADMISSION: 


53 years old female with a past medical history significant for GERD, history 

of DVT, asthma,   presented with   severe burning epigastric pain and pain with 

the deep breathing.  


She denied fever and chills 


She denied recent use of Coumadin.  


No black stools, no hematemesis, no red blood per rectum.  


Vital signs were stable 


Pulse oximetry was stable on room air 


Troponin was negative.  pro BNP 64.  


EKG revealed normal sinus rhythm  with T wave inversion in anterior and lateral 

leads.  


D-dimer was elevated 1.52.


Patient undergone   CTA of the chest which revealed no evidence of pulmonary 

embolism , but showed evidence of of left lung consolidation.  


Chest x-ray revealed retrocardiac infiltrate.  


Urinalysis was negative for evidence of UTI 


Urine toxicology screen was negative.   





Patient was given Aspirin and  beta blocker in ED along with acid blocker.


Patient was admitted for further management with diagnosis of epigastric pain,  

chest pain,  rule out acute coronary syndrome, pneumonia history of asthma.


 


CONSULTANTS:


cardiologist Dr. Lopez


pulmonary Dr. Gunter 


psychiatrist 


 


hospitals COURSE: 


Patient was admitted to telemetry floor.  


Cardiology and pulmonology closely follow.  


Serial troponin were negative.  


Lipid panel revealed elevated LDL.  Statin continued along with antiplatelet 

therapy and beta blocker. . 


Echocardiogram revealed preserved ejection fraction of 65-70% no wall motion 

abnormalities were noted, right ventricular systolic pressure of 30.  





Cardiac stress test revealed equivocal small fixed perfusion defect at the apex

,  extending into the anteroseptal and inferolateral walls.


Could be an artifact of soft tissue attenuation or physiologic thinning, but 

could represent a small infarct. 


No evidence of ischemia, at level of stress achieved.


No evidence of reversible ischemia.


Calculated ejection fraction 54% 


Cardiologist cleared  patient for discharge.   





Patient was on empiric antibiotic 


Blood culture were negative, sputum culture was not collected, since cough was 

nonproductive 


Antitussive provided as needed 


Supplemental oxygen provided as needed to keep pulse oximetry above 92% 


Pulmonary toilet provided.  


No evidence of asthma exacerbation.


DVT and GI prophylaxis provided.


Pain management addressed.


Bowel regimen instituted.


Patient clinically improved and was stable for discharge home


 


FINAL DIAGNOSES: 


Pneumonia 


GERD 


Costochondritis 


History of asthma 


Costochondritis 


Obesity 


Arthritis





DISCHARGE MEDICATIONS:


See Medication Reconciliation list.





DISCHARGE INSTRUCTIONS:


Patient was discharged home.  Follow up with the primary care provider.





I have been assigned to dictate discharge summary for this account. I was not 

involved in the patient's management.











Brooke Anderson NP Jun 27, 2018 08:52

## 2021-09-17 ENCOUNTER — HOSPITAL ENCOUNTER (INPATIENT)
Dept: HOSPITAL 87 - ER | Age: 56
LOS: 3 days | Discharge: HOME | DRG: 201 | End: 2021-09-20
Attending: INTERNAL MEDICINE | Admitting: INTERNAL MEDICINE
Payer: MEDICAID

## 2021-09-17 VITALS — HEIGHT: 64 IN | WEIGHT: 252 LBS | BODY MASS INDEX: 43.02 KG/M2

## 2021-09-17 DIAGNOSIS — J96.00: ICD-10-CM

## 2021-09-17 DIAGNOSIS — I49.3: ICD-10-CM

## 2021-09-17 DIAGNOSIS — Z88.8: ICD-10-CM

## 2021-09-17 DIAGNOSIS — I50.23: ICD-10-CM

## 2021-09-17 DIAGNOSIS — I11.0: ICD-10-CM

## 2021-09-17 DIAGNOSIS — Z90.49: ICD-10-CM

## 2021-09-17 DIAGNOSIS — E66.9: ICD-10-CM

## 2021-09-17 DIAGNOSIS — Z79.899: ICD-10-CM

## 2021-09-17 DIAGNOSIS — J45.909: ICD-10-CM

## 2021-09-17 DIAGNOSIS — Z95.810: ICD-10-CM

## 2021-09-17 DIAGNOSIS — Z79.1: ICD-10-CM

## 2021-09-17 DIAGNOSIS — M19.90: ICD-10-CM

## 2021-09-17 DIAGNOSIS — I47.1: Primary | ICD-10-CM

## 2021-09-17 LAB
APTT PPP: 36.2 SEC (ref 23.4–31)
BASOPHILS NFR BLD AUTO: 1.5 % (ref 0–2)
CHLORIDE SERPL-SCNC: 111 MEQ/L (ref 98–107)
EOSINOPHIL NFR BLD AUTO: 1.2 % (ref 0–5)
ERYTHROCYTE [DISTWIDTH] IN BLOOD BY AUTOMATED COUNT: 14.7 % (ref 11.6–14.6)
HCT VFR BLD AUTO: 36.1 % (ref 36–48)
HGB BLD-MCNC: 12.6 G/DL (ref 12–16)
INR PPP: 1
LYMPHOCYTES NFR BLD AUTO: 31.5 % (ref 20–50)
MCH RBC QN AUTO: 31.3 PG (ref 28–32)
MCV RBC AUTO: 89.5 FL (ref 81–99)
MONOCYTES NFR BLD AUTO: 4.9 % (ref 2–8)
NEUTROPHILS NFR BLD AUTO: 60.9 % (ref 40–76)
PLATELET # BLD AUTO: 420 X1000/UL (ref 130–400)
PMV BLD AUTO: 7.7 FL (ref 7.4–10.4)
PROTHROMBIN TIME: 10.9 SEC (ref 9.6–11)
RBC # BLD AUTO: 4.03 MILL/UL (ref 4.2–5.4)

## 2021-09-17 PROCEDURE — 93306 TTE W/DOPPLER COMPLETE: CPT

## 2021-09-17 PROCEDURE — 78452 HT MUSCLE IMAGE SPECT MULT: CPT

## 2021-09-17 PROCEDURE — 86900 BLOOD TYPING SEROLOGIC ABO: CPT

## 2021-09-17 PROCEDURE — 80305 DRUG TEST PRSMV DIR OPT OBS: CPT

## 2021-09-17 PROCEDURE — 83735 ASSAY OF MAGNESIUM: CPT

## 2021-09-17 PROCEDURE — 83880 ASSAY OF NATRIURETIC PEPTIDE: CPT

## 2021-09-17 PROCEDURE — 93017 CV STRESS TEST TRACING ONLY: CPT

## 2021-09-17 PROCEDURE — 93005 ELECTROCARDIOGRAM TRACING: CPT

## 2021-09-17 PROCEDURE — 84443 ASSAY THYROID STIM HORMONE: CPT

## 2021-09-17 PROCEDURE — 99285 EMERGENCY DEPT VISIT HI MDM: CPT

## 2021-09-17 PROCEDURE — 80053 COMPREHEN METABOLIC PANEL: CPT

## 2021-09-17 PROCEDURE — 93970 EXTREMITY STUDY: CPT

## 2021-09-17 PROCEDURE — 36415 COLL VENOUS BLD VENIPUNCTURE: CPT

## 2021-09-17 PROCEDURE — 71275 CT ANGIOGRAPHY CHEST: CPT

## 2021-09-17 PROCEDURE — 84484 ASSAY OF TROPONIN QUANT: CPT

## 2021-09-17 PROCEDURE — 85379 FIBRIN DEGRADATION QUANT: CPT

## 2021-09-17 PROCEDURE — 80061 LIPID PANEL: CPT

## 2021-09-17 PROCEDURE — 90732 PPSV23 VACC 2 YRS+ SUBQ/IM: CPT

## 2021-09-17 PROCEDURE — 86850 RBC ANTIBODY SCREEN: CPT

## 2021-09-17 PROCEDURE — 82550 ASSAY OF CK (CPK): CPT

## 2021-09-17 PROCEDURE — 85025 COMPLETE CBC W/AUTO DIFF WBC: CPT

## 2021-09-17 PROCEDURE — 82553 CREATINE MB FRACTION: CPT

## 2021-09-17 PROCEDURE — 80048 BASIC METABOLIC PNL TOTAL CA: CPT

## 2021-09-17 PROCEDURE — 71045 X-RAY EXAM CHEST 1 VIEW: CPT

## 2021-09-18 VITALS — DIASTOLIC BLOOD PRESSURE: 71 MMHG | SYSTOLIC BLOOD PRESSURE: 128 MMHG

## 2021-09-18 VITALS — SYSTOLIC BLOOD PRESSURE: 140 MMHG | DIASTOLIC BLOOD PRESSURE: 70 MMHG

## 2021-09-18 VITALS — SYSTOLIC BLOOD PRESSURE: 130 MMHG | DIASTOLIC BLOOD PRESSURE: 74 MMHG

## 2021-09-18 VITALS — SYSTOLIC BLOOD PRESSURE: 100 MMHG | DIASTOLIC BLOOD PRESSURE: 38 MMHG

## 2021-09-18 VITALS — SYSTOLIC BLOOD PRESSURE: 115 MMHG | DIASTOLIC BLOOD PRESSURE: 63 MMHG

## 2021-09-18 VITALS — SYSTOLIC BLOOD PRESSURE: 115 MMHG | DIASTOLIC BLOOD PRESSURE: 68 MMHG

## 2021-09-18 LAB
AMPHETAMINES UR QL SCN: NEGATIVE
BARBITURATES UR QL SCN: NEGATIVE
BASOPHILS NFR BLD AUTO: 1 % (ref 0–2)
BENZODIAZ UR QL SCN: NEGATIVE
BZE UR QL SCN: NEGATIVE
CANNABINOIDS UR QL SCN: NEGATIVE
CHLORIDE SERPL-SCNC: 108 MEQ/L (ref 98–107)
EOSINOPHIL NFR BLD AUTO: 0.8 % (ref 0–5)
ERYTHROCYTE [DISTWIDTH] IN BLOOD BY AUTOMATED COUNT: 14.9 % (ref 11.6–14.6)
HCT VFR BLD AUTO: 35.9 % (ref 36–48)
HDLC SERPL-MCNC: 57 MG/DL (ref 40–59)
HGB BLD-MCNC: 12.2 G/DL (ref 12–16)
LDLC SERPL DIRECT ASSAY-MCNC: 92 MG/DL (ref 5–100)
LYMPHOCYTES NFR BLD AUTO: 22.3 % (ref 20–50)
MCH RBC QN AUTO: 30.4 PG (ref 28–32)
MCV RBC AUTO: 89.4 FL (ref 81–99)
METHADONE UR QL SCN: NEGATIVE
MONOCYTES NFR BLD AUTO: 4.7 % (ref 2–8)
NEUTROPHILS NFR BLD AUTO: 71.2 % (ref 40–76)
OPIATES UR QL SCN: NEGATIVE
PCP UR QL SCN: NEGATIVE
PLATELET # BLD AUTO: 391 X1000/UL (ref 130–400)
PMV BLD AUTO: 7.4 FL (ref 7.4–10.4)
RBC # BLD AUTO: 4.02 MILL/UL (ref 4.2–5.4)

## 2021-09-18 RX ADMIN — ENOXAPARIN SODIUM SCH MG: 30 INJECTION SUBCUTANEOUS at 21:07

## 2021-09-18 RX ADMIN — FUROSEMIDE SCH MG: 10 INJECTION, SOLUTION INTRAMUSCULAR; INTRAVENOUS at 09:21

## 2021-09-18 RX ADMIN — FUROSEMIDE SCH MG: 10 INJECTION, SOLUTION INTRAMUSCULAR; INTRAVENOUS at 16:44

## 2021-09-18 RX ADMIN — POTASSIUM CHLORIDE SCH MEQ: 20 TABLET, EXTENDED RELEASE ORAL at 12:36

## 2021-09-19 VITALS — DIASTOLIC BLOOD PRESSURE: 73 MMHG | SYSTOLIC BLOOD PRESSURE: 145 MMHG

## 2021-09-19 VITALS — DIASTOLIC BLOOD PRESSURE: 60 MMHG | SYSTOLIC BLOOD PRESSURE: 106 MMHG

## 2021-09-19 VITALS — DIASTOLIC BLOOD PRESSURE: 63 MMHG | SYSTOLIC BLOOD PRESSURE: 136 MMHG

## 2021-09-19 VITALS — DIASTOLIC BLOOD PRESSURE: 80 MMHG | SYSTOLIC BLOOD PRESSURE: 131 MMHG

## 2021-09-19 VITALS — SYSTOLIC BLOOD PRESSURE: 111 MMHG | DIASTOLIC BLOOD PRESSURE: 63 MMHG

## 2021-09-19 VITALS — SYSTOLIC BLOOD PRESSURE: 110 MMHG | DIASTOLIC BLOOD PRESSURE: 62 MMHG

## 2021-09-19 LAB
BASOPHILS NFR BLD AUTO: 0.9 % (ref 0–2)
CK MB SERPL-CCNC: < 1 NG/ML (ref 0.5–3.6)
CK SERPL-CCNC: 71 IU/L (ref 26–192)
EOSINOPHIL NFR BLD AUTO: 1.4 % (ref 0–5)
ERYTHROCYTE [DISTWIDTH] IN BLOOD BY AUTOMATED COUNT: 14.6 % (ref 11.6–14.6)
HCT VFR BLD AUTO: 36.9 % (ref 36–48)
HDLC SERPL-MCNC: 60 MG/DL (ref 40–59)
HGB BLD-MCNC: 12.4 G/DL (ref 12–16)
LDLC SERPL DIRECT ASSAY-MCNC: 107 MG/DL (ref 5–100)
LYMPHOCYTES NFR BLD AUTO: 31.4 % (ref 20–50)
MCH RBC QN AUTO: 30.3 PG (ref 28–32)
MCV RBC AUTO: 90 FL (ref 81–99)
MONOCYTES NFR BLD AUTO: 4.3 % (ref 2–8)
NEUTROPHILS NFR BLD AUTO: 62 % (ref 40–76)
PLATELET # BLD AUTO: 365 X1000/UL (ref 130–400)
PMV BLD AUTO: 7.4 FL (ref 7.4–10.4)
RBC # BLD AUTO: 4.1 MILL/UL (ref 4.2–5.4)

## 2021-09-19 RX ADMIN — FUROSEMIDE SCH MG: 40 TABLET ORAL at 16:18

## 2021-09-19 RX ADMIN — ENOXAPARIN SODIUM SCH MG: 30 INJECTION SUBCUTANEOUS at 21:00

## 2021-09-19 RX ADMIN — POTASSIUM CHLORIDE SCH MEQ: 20 TABLET, EXTENDED RELEASE ORAL at 13:45

## 2021-09-19 RX ADMIN — ENOXAPARIN SODIUM SCH MG: 30 INJECTION SUBCUTANEOUS at 08:27

## 2021-09-19 RX ADMIN — FUROSEMIDE SCH MG: 10 INJECTION, SOLUTION INTRAMUSCULAR; INTRAVENOUS at 08:27

## 2021-09-19 RX ADMIN — LOSARTAN POTASSIUM SCH MG: 25 TABLET ORAL at 13:52

## 2021-09-19 RX ADMIN — POTASSIUM CHLORIDE SCH MEQ: 20 TABLET, EXTENDED RELEASE ORAL at 08:27

## 2021-09-20 VITALS — SYSTOLIC BLOOD PRESSURE: 93 MMHG | DIASTOLIC BLOOD PRESSURE: 62 MMHG

## 2021-09-20 VITALS — SYSTOLIC BLOOD PRESSURE: 130 MMHG | DIASTOLIC BLOOD PRESSURE: 70 MMHG

## 2021-09-20 VITALS — SYSTOLIC BLOOD PRESSURE: 113 MMHG | DIASTOLIC BLOOD PRESSURE: 54 MMHG

## 2021-09-20 VITALS — SYSTOLIC BLOOD PRESSURE: 122 MMHG | DIASTOLIC BLOOD PRESSURE: 55 MMHG

## 2021-09-20 VITALS — SYSTOLIC BLOOD PRESSURE: 127 MMHG | DIASTOLIC BLOOD PRESSURE: 77 MMHG

## 2021-09-20 VITALS — DIASTOLIC BLOOD PRESSURE: 77 MMHG | SYSTOLIC BLOOD PRESSURE: 127 MMHG

## 2021-09-20 LAB
BASOPHILS NFR BLD AUTO: 0.8 % (ref 0–2)
CHLORIDE SERPL-SCNC: 106 MEQ/L (ref 98–107)
EOSINOPHIL NFR BLD AUTO: 1.8 % (ref 0–5)
ERYTHROCYTE [DISTWIDTH] IN BLOOD BY AUTOMATED COUNT: 14.8 % (ref 11.6–14.6)
HCT VFR BLD AUTO: 36.8 % (ref 36–48)
HGB BLD-MCNC: 12.3 G/DL (ref 12–16)
LYMPHOCYTES NFR BLD AUTO: 33.7 % (ref 20–50)
MCH RBC QN AUTO: 30.1 PG (ref 28–32)
MCV RBC AUTO: 89.9 FL (ref 81–99)
MONOCYTES NFR BLD AUTO: 4.5 % (ref 2–8)
NEUTROPHILS NFR BLD AUTO: 59.2 % (ref 40–76)
PLATELET # BLD AUTO: 371 X1000/UL (ref 130–400)
PMV BLD AUTO: 7.6 FL (ref 7.4–10.4)
RBC # BLD AUTO: 4.09 MILL/UL (ref 4.2–5.4)

## 2021-09-20 RX ADMIN — POTASSIUM CHLORIDE SCH MEQ: 20 TABLET, EXTENDED RELEASE ORAL at 10:16

## 2021-09-20 RX ADMIN — ENOXAPARIN SODIUM SCH MG: 30 INJECTION SUBCUTANEOUS at 09:00

## 2021-09-20 RX ADMIN — FUROSEMIDE SCH MG: 40 TABLET ORAL at 10:16

## 2021-09-20 RX ADMIN — LOSARTAN POTASSIUM SCH MG: 25 TABLET ORAL at 10:16

## 2021-09-20 RX ADMIN — FUROSEMIDE SCH MG: 40 TABLET ORAL at 06:44
